# Patient Record
Sex: MALE | Race: BLACK OR AFRICAN AMERICAN | Employment: UNEMPLOYED | URBAN - METROPOLITAN AREA
[De-identification: names, ages, dates, MRNs, and addresses within clinical notes are randomized per-mention and may not be internally consistent; named-entity substitution may affect disease eponyms.]

---

## 2018-10-29 ENCOUNTER — OFFICE VISIT (OUTPATIENT)
Dept: FAMILY MEDICINE CLINIC | Age: 58
End: 2018-10-29

## 2018-10-29 VITALS
RESPIRATION RATE: 12 BRPM | HEIGHT: 65 IN | WEIGHT: 213 LBS | BODY MASS INDEX: 35.49 KG/M2 | HEART RATE: 65 BPM | OXYGEN SATURATION: 98 % | TEMPERATURE: 98.1 F | DIASTOLIC BLOOD PRESSURE: 89 MMHG | SYSTOLIC BLOOD PRESSURE: 129 MMHG

## 2018-10-29 DIAGNOSIS — M54.32 SCIATICA OF LEFT SIDE: Primary | ICD-10-CM

## 2018-10-29 PROBLEM — E66.01 SEVERE OBESITY (HCC): Status: ACTIVE | Noted: 2018-10-29

## 2018-10-29 RX ORDER — ESOMEPRAZOLE MAGNESIUM 40 MG/1
40 CAPSULE, DELAYED RELEASE ORAL DAILY
COMMUNITY
Start: 2018-05-18 | End: 2018-11-19

## 2018-10-29 RX ORDER — PREDNISONE 20 MG/1
TABLET ORAL
Qty: 9 TAB | Refills: 0 | Status: SHIPPED | OUTPATIENT
Start: 2018-10-29 | End: 2018-11-19

## 2018-10-29 RX ORDER — ALBUTEROL SULFATE 90 UG/1
2 AEROSOL, METERED RESPIRATORY (INHALATION)
COMMUNITY

## 2018-10-29 RX ORDER — DICLOFENAC SODIUM 75 MG/1
75 TABLET, DELAYED RELEASE ORAL 2 TIMES DAILY
COMMUNITY
Start: 2017-09-17 | End: 2018-11-19

## 2018-10-29 RX ORDER — AMITRIPTYLINE HYDROCHLORIDE 25 MG/1
25 TABLET, FILM COATED ORAL
COMMUNITY
Start: 2018-05-18 | End: 2018-11-19

## 2018-10-29 NOTE — PROGRESS NOTES
Unable to print After Visit Summary for this encounter. I have reviewed discharge instructions with the patient. The patient verbalized understanding. Guidance given regarding new medication(s) this visit, including reason for taking medicine, common side effects, and pharmacy medication was sent to if not printed. Alcohol dependence    Cannabis dependence    COPD (chronic obstructive pulmonary disease)    Hepatomegaly    Mood disorder    Opiate dependence

## 2018-10-29 NOTE — PROGRESS NOTES
Chief Complaint   Patient presents with    Ankle Pain     \"pt stated that he injury left ankle and causing pain on left side\"     1. When and where did you last receive medical care? Yes Where: [facility]    2. When and where did you last have preventive care such as mammogram, pap smears or colon screening? no    3. What is your current living situation (for example, live alone, live in home with immediate family members)? family    3. Do you have any problems with communication such trouble seeing, hearing, or understanding instructions? No    5. Do you have an advance directive? This is a document that you can give to family members with instructions for how you would want them to make health care decisions for you if you were unable to speak for yourself. (For example, unconscious, delerious)No    PMH/FH/Social Hx reviewed and updated as needed     Applicable screenings reviewed and updated as needed  Medication reconciliation performed. Patient does not need medication refills. Health Maintenance reviewed.

## 2018-10-29 NOTE — PROGRESS NOTES
HPI  Radha Landaverde is a 66 y.o. male being seen today for   Chief Complaint   Patient presents with    Ankle Pain     \"pt stated that he injury left ankle and causing pain on left side\"   . he states that in April 2018 he was in [facility] for work and he stepped in a hole. A tree root went in  his leg. Developed pain from ankle up his leg to hip level. Prior to this he was active and did physical work. 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 Now has pain under his scrotum that is new and low back pain was not present before. Bending over at the waist does help when pain is bad and he thinks leg might give out. Does have recent PT for hip and ankle    No incontinence. Does have some urinary hesitancy x 8 months. Past Medical History:   Diagnosis Date    Asthma          ROS  Patient states that he is feeling well. Denies complaints of chest pain, shortness of breath, swelling of legs, dizziness or weakness. he denies nausea, vomiting or diarrhea. Current Outpatient Medications   Medication Sig    albuterol (PROVENTIL HFA, VENTOLIN HFA, PROAIR HFA) 90 mcg/actuation inhaler Take 2 Puffs by inhalation every four (4) hours as needed.  predniSONE (DELTASONE) 20 mg tablet 2 tabs x 3 days, then one tab x 3 days    esomeprazole (NEXIUM) 40 mg capsule Take 40 mg by mouth daily.  amitriptyline (ELAVIL) 25 mg tablet Take 25 mg by mouth nightly.  diclofenac EC (VOLTAREN) 75 mg EC tablet Take 75 mg by mouth two (2) times a day. No current facility-administered medications for this visit. PE  Visit Vitals  /89 (BP 1 Location: Left arm, BP Patient Position: Sitting)   Pulse 65   Temp 98.1 °F (36.7 °C) (Temporal)   Resp 12   Ht 5' 5\" (1.651 m)   Wt 213 lb (96.6 kg)   SpO2 98%   BMI 35.45 kg/m²        Alert and oriented with normal mood and affect. he is well developed and well nourished . Lungs are clear without wheezing. Heart rate is regular without murmurs or gallops.  There is no lower extremity edema. Walks with a cane. Decreased strength and sensation left LE. Well healed scar left shin  No results found for this or any previous visit.       Assessment and Plan:        ICD-10-CM ICD-9-CM    1. Sciatica of left side M54.32 724.3 MRI LUMB SPINE WO CONT       Sounds like straightforward sciatica  Prednisone burst  Instructed in sciatica stretches and will check MRI marco a George MD

## 2018-10-29 NOTE — PATIENT INSTRUCTIONS
Sciatica: Exercises  Your Care Instructions  Here are some examples of typical rehabilitation exercises for your condition. Start each exercise slowly. Ease off the exercise if you start to have pain. Your doctor or physical therapist will tell you when you can start these exercises and which ones will work best for you. When you are not being active, find a comfortable position for rest. Some people are comfortable on the floor or a medium-firm bed with a small pillow under their head and another under their knees. Some people prefer to lie on their side with a pillow between their knees. Don't stay in one position for too long. Take short walks (10 to 20 minutes) every 2 to 3 hours. Avoid slopes, hills, and stairs until you feel better. Walk only distances you can manage without pain, especially leg pain. How to do the exercises  Back stretches    1. Get down on your hands and knees on the floor. 2. Relax your head and allow it to droop. Round your back up toward the ceiling until you feel a nice stretch in your upper, middle, and lower back. Hold this stretch for as long as it feels comfortable, or about 15 to 30 seconds. 3. Return to the starting position with a flat back while you are on your hands and knees. 4. Let your back sway by pressing your stomach toward the floor. Lift your buttocks toward the ceiling. 5. Hold this position for 15 to 30 seconds. 6. Repeat 2 to 4 times. Follow-up care is a key part of your treatment and safety. Be sure to make and go to all appointments, and call your doctor if you are having problems. It's also a good idea to know your test results and keep a list of the medicines you take. Where can you learn more? Go to http://jade-hermilo.info/. Enter Q144 in the search box to learn more about \"Sciatica: Exercises. \"  Current as of: November 29, 2017  Content Version: 11.8  © 1485-0937 Healthwise, Incorporated.  Care instructions adapted under license by [address] (which disclaims liability or warranty for this information). If you have questions about a medical condition or this instruction, always ask your healthcare professional. [address] any warranty or liability for your use of this information.

## 2018-11-09 ENCOUNTER — HOSPITAL ENCOUNTER (OUTPATIENT)
Dept: MRI IMAGING | Age: 58
Discharge: HOME | End: 2018-11-09
Attending: FAMILY MEDICINE

## 2018-11-09 DIAGNOSIS — M54.32 SCIATICA OF LEFT SIDE: ICD-10-CM

## 2018-11-09 PROCEDURE — 72148 MRI LUMBAR SPINE W/O DYE: CPT

## 2018-11-14 DIAGNOSIS — M54.32 SCIATICA OF LEFT SIDE: Primary | ICD-10-CM

## 2018-11-19 ENCOUNTER — OFFICE VISIT (OUTPATIENT)
Dept: FAMILY MEDICINE CLINIC | Age: 58
End: 2018-11-19

## 2018-11-19 VITALS
BODY MASS INDEX: 35.49 KG/M2 | HEART RATE: 74 BPM | WEIGHT: 213 LBS | SYSTOLIC BLOOD PRESSURE: 111 MMHG | RESPIRATION RATE: 10 BRPM | DIASTOLIC BLOOD PRESSURE: 72 MMHG | OXYGEN SATURATION: 96 % | TEMPERATURE: 97.5 F | HEIGHT: 65 IN

## 2018-11-19 DIAGNOSIS — M54.32 SCIATICA OF LEFT SIDE: Primary | ICD-10-CM

## 2018-11-19 RX ORDER — GABAPENTIN 300 MG/1
300 CAPSULE ORAL 3 TIMES DAILY
Qty: 90 CAP | Refills: 2 | Status: SHIPPED | OUTPATIENT
Start: 2018-11-19

## 2018-11-19 NOTE — PROGRESS NOTES
1. Have you been to the ER, urgent care clinic since your last visit? Hospitalized since your last visit? No    2. Have you seen or consulted any other health care providers outside of the [address] [place] [address] since your last visit? Include any pap smears or colon screening.  No

## 2018-11-19 NOTE — PROGRESS NOTES
FLORINDA Good is a 66 y.o. male being seen today for   Chief Complaint   Patient presents with    Follow-up   . he states that he is here for follow up of lumbar MRI. Continues to have left leg give out at times, also a strange pain that occurs in left groin and scrotum at times. Would like to try something for pain. No relief from elavil or nsaids in the past.    Past Medical History:   Diagnosis Date    Asthma          ROS  Patient states that he is feeling well. Denies complaints of chest pain, shortness of breath, swelling of legs, dizziness or weakness. he denies nausea, vomiting or diarrhea. Current Outpatient Medications   Medication Sig    gabapentin (NEURONTIN) 300 mg capsule Take 1 Cap by mouth three (3) times daily.  albuterol (PROVENTIL HFA, VENTOLIN HFA, PROAIR HFA) 90 mcg/actuation inhaler Take 2 Puffs by inhalation every four (4) hours as needed. No current facility-administered medications for this visit. PE  Visit Vitals  /72 (BP 1 Location: Left arm, BP Patient Position: Sitting)   Pulse 74   Temp 97.5 °F (36.4 °C) (Temporal)   Resp 10   Ht 5' 5\" (1.651 m)   Wt 213 lb (96.6 kg)   SpO2 96%   BMI 35.45 kg/m²        Alert and oriented with normal mood and affect. he is well developed and well nourished . No results found for this or any previous visit.       Assessment and Plan:        ICD-10-CM ICD-9-CM    1. Sciatica of left side M54.32 724.3      Follow up with spine clinic  Trial gabapentin      Phyllistine MD Edwin

## 2018-11-20 PROBLEM — M54.32 SCIATICA OF LEFT SIDE: Status: ACTIVE | Noted: 2018-11-20

## 2018-11-29 ENCOUNTER — TELEPHONE (OUTPATIENT)
Dept: FAMILY MEDICINE CLINIC | Age: 58
End: 2018-11-29

## 2018-11-29 NOTE — TELEPHONE ENCOUNTER
Patient Marquis Dipesh MD   You 2 weeks ago      Can you call this pt and make him follow up with spine surgery/ortho (Routing comment)         Time, date and location reviewed with patient. Patient expressed understanding. Patient applied for medicaid.

## 2019-01-07 ENCOUNTER — OFFICE VISIT (OUTPATIENT)
Dept: ORTHOPEDIC SURGERY | Age: 59
End: 2019-01-07

## 2019-01-07 VITALS
TEMPERATURE: 97.4 F | WEIGHT: 218.8 LBS | RESPIRATION RATE: 16 BRPM | BODY MASS INDEX: 36.46 KG/M2 | DIASTOLIC BLOOD PRESSURE: 81 MMHG | HEART RATE: 71 BPM | HEIGHT: 65 IN | SYSTOLIC BLOOD PRESSURE: 120 MMHG

## 2019-01-07 DIAGNOSIS — M51.37 DEGENERATION, INTERVERTEBRAL DISC, LUMBOSACRAL: Primary | ICD-10-CM

## 2019-01-07 DIAGNOSIS — M47.817 ARTHROPATHY OF LUMBOSACRAL FACET JOINT: ICD-10-CM

## 2019-01-07 DIAGNOSIS — M48.07 STENOSIS OF LATERAL RECESS OF LUMBOSACRAL SPINE: ICD-10-CM

## 2019-01-07 RX ORDER — PREDNISONE 20 MG/1
TABLET ORAL
Qty: 28 TAB | Refills: 0 | Status: SHIPPED | OUTPATIENT
Start: 2019-01-07 | End: 2019-03-08 | Stop reason: ALTCHOICE

## 2019-01-07 NOTE — LETTER
NOTIFICATION RETURN TO WORK / SCHOOL 
 
1/7/2019 3:57 PM 
 
Mr. Dread Quiroz [address] [address] To Whom It May Concern: 
 
Dread Quiroz is currently under the care of [address] [place] [address] Off work until evaluated by spine surgeon. Encounter Diagnoses Name Primary?  Degeneration, intervertebral disc, lumbosacral Yes  Stenosis of lateral recess of lumbosacral spine  Arthropathy of lumbosacral facet joint If there are questions or concerns please have the patient contact our office.  
 
 
 
Sincerely, 
 
 
Genny Ervin, DO

## 2019-01-07 NOTE — PROGRESS NOTES
HISTORY OF PRESENT ILLNESS    Brent Fung is a 66y.o. year old male comes in today as new patient for: back pain/sciatica    Patients symptoms have been present for almost 2 years. Pain level 10/10 low back into legs, It has slightly improved with predniosne and minimal w/ gabapentin  It is described as pain in low back into left leg prior and now right w/ numb/tingle as well. Original injury at work [id number] when stepped in hole and root went into left leg and twisted back. IMAGING: MRI lumbar 11/9/18  IMPRESSION:  1. Thecal sac stenosis. --High-grade at L5-S1 secondary to epidural lipomatosis. --Moderate at L4-L5 secondary to degenerative changes and epidural  lipomatosis. --Mild at L3-L4. 2.  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           --7 mm left intracanalicular facet joint synovial cyst.          --Compresses the left S1 nerve root. 3.  Lateral recess stenoses. --Marked at left L4-L5. --Lower grade at right L4-L5 and bilateral L5-S1.  4.  Facet arthrosis with joint effusions. --Marked at left L4-L5 with early changes of intracanalicular synovial  cyst accumulation. --Moderate right L4-L5 and bilateral L5-S1. No past surgical history on file. Social History     Socioeconomic History    Marital status:      Spouse name: Not on file    Number of children: Not on file    Years of education: Not on file    Highest education level: Not on file   Tobacco Use    Smoking status: Current Every Day Smoker     Packs/day: 0.50     Types: Cigarettes    Smokeless tobacco: Never Used   Substance and Sexual Activity    Alcohol use: No     Frequency: Never    Drug use: No      Current Outpatient Medications   Medication Sig Dispense Refill    gabapentin (NEURONTIN) 300 mg capsule Take 1 Cap by mouth three (3) times daily.  90 Cap 2    albuterol (PROVENTIL HFA, VENTOLIN HFA, PROAIR HFA) 90 mcg/actuation inhaler Take 2 Puffs by inhalation every four (4) hours as needed. Past Medical History:   Diagnosis Date    Asthma      Family History   Problem Relation Age of Onset    Diabetes Mother     Hypertension Mother     Asthma Mother     No Known Problems Father          ROS:  No incont, fever. All other systems reviewed and negative aside from that written in the HPI. Objective:  /81   Pulse 71   Temp 97.4 °F (36.3 °C)   Resp 16   Ht 5' 5\" (1.651 m)   Wt 218 lb 12.8 oz (99.2 kg)   BMI 36.41 kg/m²   GEN:  Appears stated age in NAD. NEURO:  Sensation intact to light touch but significant decrease perineum. Reflexes +1/4 patellar and Achilles bilaterally. M/S:  Examined seated and supine. Slump negative. LE Strength +5/5 bilaterally Piriformis minimally tight left  EXT:  no clubbing/cyanosis. no edema. SKIN: Warm & dry w/o rash. HEENT: Conjunctiva/lids WNL. External canals/nares WNL. Tongue midline. PERRL, EOMI. Hearing intact. NECK: Trachea midline. Supple, Full ROM. No thyromegaly. CARDIAC: No edema. LUNGS: Normal effort. ABD: Soft, no masses. No HSM. PSYCH: A+O x3. Appropriate judgment and insight. Assessment/Plan:     ICD-10-CM ICD-9-CM    1. Degeneration, intervertebral disc, lumbosacral M51.37 722.52 predniSONE (DELTASONE) 20 mg tablet      REFERRAL TO ORTHOPEDICS   2. Stenosis of lateral recess of lumbosacral spine M48.07 724.02 predniSONE (DELTASONE) 20 mg tablet      REFERRAL TO ORTHOPEDICS   3. Arthropathy of lumbosacral facet joint M47.817 721. 3 predniSONE (DELTASONE) 20 mg tablet      REFERRAL TO ORTHOPEDICS       Patient (or guardian if minor) verbalizes understanding of evaluation and plan. Will refer maria ines for likely intervention as significant neuro Sx w/ MRI corroboration. Prednisone taper as benefit prior.

## 2019-01-11 ENCOUNTER — OFFICE VISIT (OUTPATIENT)
Dept: ORTHOPEDIC SURGERY | Age: 59
End: 2019-01-11

## 2019-01-11 VITALS
WEIGHT: 218 LBS | DIASTOLIC BLOOD PRESSURE: 83 MMHG | OXYGEN SATURATION: 98 % | BODY MASS INDEX: 36.32 KG/M2 | HEIGHT: 65 IN | SYSTOLIC BLOOD PRESSURE: 116 MMHG | HEART RATE: 57 BPM | RESPIRATION RATE: 15 BRPM | TEMPERATURE: 98.1 F

## 2019-01-11 DIAGNOSIS — M54.50 LUMBAR SPINE PAIN: Primary | ICD-10-CM

## 2019-01-11 DIAGNOSIS — M43.16 SPONDYLOLISTHESIS OF LUMBAR REGION: ICD-10-CM

## 2019-01-11 DIAGNOSIS — R29.898 LEFT LEG WEAKNESS: ICD-10-CM

## 2019-01-11 NOTE — PATIENT INSTRUCTIONS
Electromyogram (EMG) and Nerve Conduction Studies: About These Tests  What are they? An electromyogram (EMG) measures the electrical activity of your muscles when you are not using them (at rest) and when you tighten them (muscle contraction). Nerve conduction studies (NCS) measure how well and how fast the nerves can send electrical signals. EMG and nerve conduction studies are often done together. If they are done together, the nerve conduction studies are done before the EMG. Why are they done? You may need an EMG to find diseases that damage your muscles or nerves or to find why you cannot move your muscles (paralysis), why they feel weak, or why they twitch. You may need nerve conduction studies to find damage to the nerves that lead from the brain and spinal cord to the rest of the body (peripheral nervous system). Nerve conduction studies are often used to help find nerve disorders, such as carpal tunnel syndrome. How can you prepare for these tests? · Tell your doctors ALL the medicines, vitamins, supplements, and herbal remedies you take. Some medicines can affect the test results. You may need to stop taking some medicines before you have this test.     · If you take aspirin or some other blood thinner, be sure to talk to your doctor. He or she will tell you if you should stop taking it before your test. Make sure that you understand exactly what your doctor wants you to do.     · Wear loose-fitting clothing. You may be given a hospital gown to wear.     · The electrodes for the test are attached to your skin. Your skin needs to be clean and free of sprays, oils, creams, and lotions. What happens during the tests? You lie on a table or bed or sit in a reclining chair so your muscles are relaxed. For an EMG:  · Your doctor will insert a needle electrode into a muscle.  This will record the electrical activity while the muscle is at rest. You may feel a quick, sharp pain when the needle electrode is put into a muscle. · Your doctor will ask you to tighten the same muscle slowly and steadily while the electrical activity is recorded. · Your doctor may move the electrode to a different area of the muscle or a different muscle. For nerve conduction studies:  · Your doctor will attach two types of electrodes to your skin. ? One type of electrode is placed over a nerve and will give the nerve an electrical pulse. ? The other type of electrode is placed over the muscle that the nerve controls. It will record how long it takes the muscle to react to the electrical pulse. · You will be able to feel the electrical pulses. They are small shocks and are safe. What else should you know about these tests? · After an EMG, you may be sore and have a tingling feeling in your muscles for up to 2 days. You may have small bruises or swelling at the needle site. · For an EMG, you may be asked to sign a consent form. Talk to your doctor about any concerns you have about the need for the test, its risks, how it will be done, or what the results will mean. How long do they take? · An EMG may take 30 to 60 minutes. · Nerve conduction tests may take from 15 minutes to 1 hour or more. It depends on how many nerves and muscles your doctor tests. What happens after these tests? · If any of the test areas are sore:  ? Put ice or a cold pack on the area for 10 to 20 minutes at a time. Put a thin cloth between the ice and your skin. ? Take an over-the-counter pain medicine, such as acetaminophen (Tylenol), ibuprofen (Advil, Motrin), or naproxen (Aleve). Be safe with medicines. Read and follow all instructions on the label. · You will probably be able to go home right away. · You can go back to your usual activities right away. When should you call for help?   Watch closely for changes in your health, and be sure to contact your doctor if:  · Muscle pain from an EMG test gets worse or you have swelling, tenderness, or pus at any of the needle sites. · You have any problems that you think may be from the test.  · You have any questions about the test or have not received your results. Follow-up care is a key part of your treatment and safety. Be sure to make and go to all appointments, and call your doctor if you are having problems. It's also a good idea to keep a list of the medicines you take. Ask your doctor when you can expect to have your test results. Where can you learn more? Go to http://jade-hermilo.info/. Enter F733 in the search box to learn more about \"Electromyogram (EMG) and Nerve Conduction Studies: About These Tests. \"  Current as of: June 4, 2018  Content Version: 11.8  © 6782-3190 Healthwise, Incorporated. Care instructions adapted under license by [employer] (which disclaims liability or warranty for this information). If you have questions about a medical condition or this instruction, always ask your healthcare professional. [address] any warranty or liability for your use of this information.

## 2019-01-22 ENCOUNTER — TELEPHONE (OUTPATIENT)
Dept: ORTHOPEDIC SURGERY | Age: 59
End: 2019-01-22

## 2019-01-22 ENCOUNTER — DOCUMENTATION ONLY (OUTPATIENT)
Dept: ORTHOPEDIC SURGERY | Age: 59
End: 2019-01-22

## 2019-01-22 DIAGNOSIS — M54.50 LUMBAR PAIN: Primary | ICD-10-CM

## 2019-01-22 NOTE — TELEPHONE ENCOUNTER
Pt has surgery pending with Dr. Donahue January. States he broke his cane last night and is requesting a prescription for one be sent to [facility] on [facility]. Any questions, pt can be reached at [phone].

## 2019-01-22 NOTE — TELEPHONE ENCOUNTER
Called patient and verified . Patient to call back with the medical supply store name and fax number to send the order to.

## 2019-01-22 NOTE — TELEPHONE ENCOUNTER
Called patient and verified . Patient was informed order faxed over. Patient verbalized understanding and no further questions or concerns at this time.

## 2019-01-22 NOTE — TELEPHONE ENCOUNTER
Patient called back [facility], [place], [address] phone # [phone] fax# [phone]. Patient would like a call when faxed over so he knows to go.

## 2019-01-23 ENCOUNTER — HOSPITAL ENCOUNTER (OUTPATIENT)
Dept: CT IMAGING | Age: 59
Discharge: HOME | End: 2019-01-23
Payer: MEDICAID

## 2019-01-23 DIAGNOSIS — M43.16 SPONDYLOLISTHESIS OF LUMBAR REGION: ICD-10-CM

## 2019-01-23 DIAGNOSIS — M54.50 LUMBAR SPINE PAIN: ICD-10-CM

## 2019-01-23 PROCEDURE — 72131 CT LUMBAR SPINE W/O DYE: CPT

## 2019-01-29 ENCOUNTER — OFFICE VISIT (OUTPATIENT)
Dept: CARDIOLOGY CLINIC | Age: 59
End: 2019-01-29

## 2019-01-29 VITALS
BODY MASS INDEX: 36.99 KG/M2 | HEART RATE: 82 BPM | DIASTOLIC BLOOD PRESSURE: 75 MMHG | HEIGHT: 65 IN | WEIGHT: 222 LBS | SYSTOLIC BLOOD PRESSURE: 111 MMHG

## 2019-01-29 DIAGNOSIS — F17.200 SMOKER: ICD-10-CM

## 2019-01-29 DIAGNOSIS — I45.6 WPW (WOLFF-PARKINSON-WHITE SYNDROME): ICD-10-CM

## 2019-01-29 DIAGNOSIS — R94.31 ABNORMAL ELECTROCARDIOGRAM: Primary | ICD-10-CM

## 2019-01-29 DIAGNOSIS — I50.32 CHRONIC DIASTOLIC CONGESTIVE HEART FAILURE (HCC): ICD-10-CM

## 2019-01-29 RX ORDER — FUROSEMIDE 20 MG/1
20 TABLET ORAL DAILY
Qty: 30 TAB | Refills: 4 | Status: SHIPPED | OUTPATIENT
Start: 2019-01-29 | End: 2019-05-16 | Stop reason: SDUPTHER

## 2019-01-29 NOTE — PROGRESS NOTES
HISTORY OF PRESENT ILLNESS  Katelyn Shook is a 66 y.o. male. New Patient   The history is provided by the patient. This is a recurrent problem. The problem occurs every several days. The problem has not changed since onset. Associated symptoms include shortness of breath. Pertinent negatives include no chest pain, no abdominal pain and no headaches. Shortness of Breath   The history is provided by the patient. This is a chronic problem. The problem occurs frequently. The problem has been gradually worsening. Associated symptoms include leg swelling. Pertinent negatives include no fever, no headaches, no ear pain, no neck pain, no cough, no sputum production, no hemoptysis, no wheezing, no PND, no orthopnea, no chest pain, no vomiting, no abdominal pain, no rash and no claudication. Associated medical issues include heart failure. Palpitations    The history is provided by the patient. This is a chronic problem. The problem has not changed since onset. The problem occurs every several days. Associated symptoms include shortness of breath. Pertinent negatives include no diaphoresis, no fever, no chest pain, no claudication, no orthopnea, no PND, no abdominal pain, no nausea, no vomiting, no headaches, no dizziness, no weakness, no cough, no hemoptysis and no sputum production. Review of Systems   Constitutional: Negative for chills, diaphoresis, fever and weight loss. HENT: Negative for ear pain and hearing loss. Eyes: Negative for blurred vision. Respiratory: Positive for shortness of breath. Negative for cough, hemoptysis, sputum production, wheezing and stridor. Cardiovascular: Positive for palpitations and leg swelling. Negative for chest pain, orthopnea, claudication and PND. Gastrointestinal: Negative for abdominal pain, heartburn, nausea and vomiting. Musculoskeletal: Negative for myalgias and neck pain. Skin: Negative for rash.    Neurological: Negative for dizziness, tingling, tremors, focal weakness, loss of consciousness, weakness and headaches. Psychiatric/Behavioral: Negative for depression and suicidal ideas. Family History   Problem Relation Age of Onset    Diabetes Mother     Hypertension Mother     Asthma Mother     No Known Problems Father        Past Medical History:   Diagnosis Date    Asthma        History reviewed. No pertinent surgical history. Social History     Tobacco Use    Smoking status: Current Every Day Smoker     Packs/day: 0.50     Types: Cigarettes    Smokeless tobacco: Never Used   Substance Use Topics    Alcohol use: Yes     Frequency: 2-4 times a month     Drinks per session: 3 or 4     Binge frequency: Never       No Known Allergies    Outpatient Medications Marked as Taking for the 1/29/19 encounter (Office Visit) with Fernando Fritz MD   Medication Sig Dispense Refill    furosemide (LASIX) 20 mg tablet Take 1 Tab by mouth daily. 30 Tab 4    predniSONE (DELTASONE) 20 mg tablet Take 2 tabs in AM with food for 7 days then 1 tab in AM with food until gone 28 Tab 0    gabapentin (NEURONTIN) 300 mg capsule Take 1 Cap by mouth three (3) times daily. 90 Cap 2    albuterol (PROVENTIL HFA, VENTOLIN HFA, PROAIR HFA) 90 mcg/actuation inhaler Take 2 Puffs by inhalation every four (4) hours as needed. Visit Vitals  /75   Pulse 82   Ht 5' 5\" (1.651 m)   Wt 100.7 kg (222 lb)   BMI 36.94 kg/m²     Physical Exam   Constitutional: He is oriented to person, place, and time. He appears well-developed and well-nourished. No distress. HENT:   Head: Atraumatic. Mouth/Throat: No oropharyngeal exudate. Eyes: Conjunctivae are normal. No scleral icterus. Neck: Normal range of motion. Neck supple. No JVD present. No tracheal deviation present. No thyromegaly present. Cardiovascular: Normal rate and regular rhythm. Exam reveals no gallop. No murmur heard. Pulmonary/Chest: Effort normal and breath sounds normal. No stridor.  He has no wheezes. He has no rales. Abdominal: Soft. There is no tenderness. There is no rebound and no guarding. Musculoskeletal: Normal range of motion. He exhibits edema (mild ble edema). He exhibits no tenderness. Neurological: He is alert and oriented to person, place, and time. He exhibits normal muscle tone. Skin: Skin is warm. He is not diaphoretic. Psychiatric: He has a normal mood and affect. His behavior is normal.     ekg sinus rhythm with WPW  ASSESSMENT and PLAN    ICD-10-CM ICD-9-CM    1. Abnormal electrocardiogram R94.31 794.31 AMB POC EKG ROUTINE W/ 12 LEADS, INTER & REP   2. Chronic diastolic congestive heart failure (HCC) I50.32 428.32 MAGNESIUM     974.3 METABOLIC PANEL, BASIC      ECHO ADULT COMPLETE    NYHA class III   3. WPW (Rashad-Parkinson-White syndrome) I45.6 426.7 [place] EXTERNAL MOBILE CV TELEMETRY W/I&REPORT UP TO 30 DAYS   4. Smoker F17.200 305.1      Orders Placed This Encounter    MAGNESIUM     Standing Status:   Future     Standing Expiration Date:   0/70/7878    METABOLIC PANEL, BASIC     Standing Status:   Future     Standing Expiration Date:   1/30/2020    AMB POC EKG ROUTINE W/ 12 LEADS, INTER & REP     Order Specific Question:   Reason for Exam:     Answer:   abn ekg    [place] EXTERNAL MOBILE CV TELEMETRY W/I&REPORT UP TO 30 DAYS    furosemide (LASIX) 20 mg tablet     Sig: Take 1 Tab by mouth daily. Dispense:  30 Tab     Refill:  4     Follow-up Disposition:  Return in about 6 weeks (around 3/12/2019). reviewed diet, exercise and weight control  very strongly urged to quit smoking to reduce cardiovascular risk. Patient is a 68-year-old male seen for worsening exertional dyspnea and abnormal EKG. Exertional dyspnea for the last several months with intermittent orthopnea and PND. Also complains of chronic lower extremity edema. Has mild chronic diastolic CHF NYHA class II/III. We will obtain echocardiogram and start him on Lasix. EKG reveals sinus rhythm with WPW. Complains of intermittent palpitations and dizziness which is chronic for him. Will obtain cardiac monitor to assess atrial/ventricular arrhythmias. Follow-up in 6 weeks.

## 2019-01-31 ENCOUNTER — HOSPITAL ENCOUNTER (OUTPATIENT)
Dept: LAB | Age: 59
Discharge: HOME | End: 2019-01-31

## 2019-01-31 PROCEDURE — 99001 SPECIMEN HANDLING PT-LAB: CPT

## 2019-02-01 ENCOUNTER — HOSPITAL ENCOUNTER (OUTPATIENT)
Dept: RESPIRATORY THERAPY | Age: 59
Discharge: HOME | End: 2019-02-01
Attending: FAMILY MEDICINE
Payer: MEDICAID

## 2019-02-01 DIAGNOSIS — R06.02 SOB (SHORTNESS OF BREATH): ICD-10-CM

## 2019-02-01 DIAGNOSIS — J45.909 ASTHMATIC BRONCHITIS: ICD-10-CM

## 2019-02-01 LAB
BUN SERPL-MCNC: 16 MG/DL (ref 6–24)
BUN/CREAT SERPL: 15 (ref 9–20)
CALCIUM SERPL-MCNC: 9.7 MG/DL (ref 8.7–10.2)
CHLORIDE SERPL-SCNC: 101 MMOL/L (ref 96–106)
CO2 SERPL-SCNC: 23 MMOL/L (ref 20–29)
CREAT SERPL-MCNC: 1.05 MG/DL (ref 0.76–1.27)
GLUCOSE SERPL-MCNC: 88 MG/DL (ref 65–99)
MAGNESIUM SERPL-MCNC: 2.2 MG/DL (ref 1.6–2.3)
POTASSIUM SERPL-SCNC: 4 MMOL/L (ref 3.5–5.2)
SODIUM SERPL-SCNC: 141 MMOL/L (ref 134–144)

## 2019-02-01 PROCEDURE — 94729 DIFFUSING CAPACITY: CPT

## 2019-02-01 PROCEDURE — 94727 GAS DIL/WSHOT DETER LNG VOL: CPT

## 2019-02-01 PROCEDURE — 94060 EVALUATION OF WHEEZING: CPT

## 2019-02-05 DIAGNOSIS — I50.32 CHRONIC DIASTOLIC CONGESTIVE HEART FAILURE (HCC): ICD-10-CM

## 2019-02-08 NOTE — TELEPHONE ENCOUNTER
Called and spoke with patient advised of medication and appointment. Patient states understanding.     Patient scheduled with Dr Marco Posada on 2/14/19 @ 8:40 am

## 2019-02-11 ENCOUNTER — CLINICAL SUPPORT (OUTPATIENT)
Dept: CARDIOLOGY CLINIC | Age: 59
End: 2019-02-11

## 2019-02-11 VITALS
WEIGHT: 222 LBS | SYSTOLIC BLOOD PRESSURE: 125 MMHG | HEIGHT: 65 IN | DIASTOLIC BLOOD PRESSURE: 80 MMHG | BODY MASS INDEX: 36.99 KG/M2

## 2019-02-11 DIAGNOSIS — I50.32 CHRONIC DIASTOLIC CONGESTIVE HEART FAILURE (HCC): ICD-10-CM

## 2019-02-13 LAB
ECHO AO ASC DIAM: 3.53 CM
ECHO AO ROOT DIAM: 3.73 CM
ECHO AV PEAK GRADIENT: 8 MMHG
ECHO AV PEAK VELOCITY: 141.78 CM/S
ECHO EST RA PRESSURE: 3 MMHG
ECHO LA AREA 2C: 21.42 CM2
ECHO LA AREA 4C: 14.5 CM2
ECHO LA MAJOR AXIS: 4.12 CM
ECHO LA VOL 2C: 67.76 ML (ref 18–58)
ECHO LA VOL 4C: 32.65 ML (ref 18–58)
ECHO LA VOL BP: 54.4 ML (ref 18–58)
ECHO LA VOL/BSA BIPLANE: 26.31 ML/M2 (ref 16–28)
ECHO LA VOLUME INDEX A2C: 32.77 ML/M2 (ref 16–28)
ECHO LA VOLUME INDEX A4C: 15.79 ML/M2 (ref 16–28)
ECHO LV E' LATERAL VELOCITY: 10.74 CM/S
ECHO LV E' SEPTAL VELOCITY: 10.22 CM/S
ECHO LV INTERNAL DIMENSION DIASTOLIC: 4.83 CM (ref 4.2–5.9)
ECHO LV INTERNAL DIMENSION SYSTOLIC: 3.29 CM
ECHO LV IVSD: 1.17 CM (ref 0.6–1)
ECHO LV MASS 2D: 283.7 G (ref 88–224)
ECHO LV MASS INDEX 2D: 137.2 G/M2 (ref 49–115)
ECHO LV POSTERIOR WALL DIASTOLIC: 1.36 CM (ref 0.6–1)
ECHO LVOT PEAK GRADIENT: 5.3 MMHG
ECHO LVOT PEAK VELOCITY: 115.27 CM/S
ECHO MV "A" WAVE DURATION: 133.1 MSEC
ECHO MV A VELOCITY: 56.9 CM/S
ECHO MV AREA PHT: 3.1 CM2
ECHO MV E DECELERATION TIME (DT): 242.1 MS
ECHO MV E VELOCITY: 0.78 CM/S
ECHO MV E/A RATIO: 0.01
ECHO MV E/E' LATERAL: 0.07
ECHO MV E/E' RATIO (AVERAGED): 0.07
ECHO MV E/E' SEPTAL: 0.08
ECHO MV PRESSURE HALF TIME (PHT): 70.2 MS
ECHO PULMONARY ARTERY SYSTOLIC PRESSURE (PASP): 27 MMHG
ECHO PV MAX VELOCITY: 99.92 CM/S
ECHO PV PEAK GRADIENT: 4 MMHG
ECHO RA AREA 4C: 18.8 CM2
ECHO RV INTERNAL DIMENSION: 4.2 CM
ECHO RV TAPSE: 2.15 CM (ref 1.5–2)
ECHO TV REGURGITANT MAX VELOCITY: 244.85 CM/S
ECHO TV REGURGITANT PEAK GRADIENT: 24 MMHG

## 2019-02-25 ENCOUNTER — DOCUMENTATION ONLY (OUTPATIENT)
Dept: CARDIOLOGY CLINIC | Age: 59
End: 2019-02-25

## 2019-02-25 NOTE — PROGRESS NOTES
Asked by Dr. Sophia Lovell to see for WPW, was placed on schedule 2/14/19 in office. No-showed. Office called patient and sent letter, no response. I will forward to Dr. Sophia Lovell, will continue to try to arrange appointment.

## 2019-02-27 ENCOUNTER — OFFICE VISIT (OUTPATIENT)
Dept: ORTHOPEDIC SURGERY | Age: 59
End: 2019-02-27

## 2019-02-27 VITALS
HEIGHT: 66 IN | DIASTOLIC BLOOD PRESSURE: 64 MMHG | HEART RATE: 62 BPM | RESPIRATION RATE: 14 BRPM | WEIGHT: 219.2 LBS | BODY MASS INDEX: 35.23 KG/M2 | OXYGEN SATURATION: 99 % | SYSTOLIC BLOOD PRESSURE: 97 MMHG | TEMPERATURE: 97.5 F

## 2019-02-27 DIAGNOSIS — R20.0 NUMBNESS AND TINGLING OF LEFT LEG: ICD-10-CM

## 2019-02-27 DIAGNOSIS — R94.131 ABNORMAL EMG: ICD-10-CM

## 2019-02-27 DIAGNOSIS — M79.605 LEFT LEG PAIN: Primary | ICD-10-CM

## 2019-02-27 DIAGNOSIS — R29.898 WEAKNESS OF LEFT LEG: ICD-10-CM

## 2019-02-27 DIAGNOSIS — R20.2 NUMBNESS AND TINGLING OF LEFT LEG: ICD-10-CM

## 2019-02-27 NOTE — PROGRESS NOTES
[address]  [place], [address] [place] [address] Street  Phone: [phone]  Fax: [phone]        Lili Chucho  : 1960  PCP: George Villalta MD  2019    ELECTROMYOGRAPHY AND NERVE CONDUCTION STUDIES    Yon Lorenzana was referred by Dr. Elizabeth Mullins for electrodiagnostic evaluation of LLE paraesthesia. NCV & EMG Findings:  All nerve conduction studies (as indicated in the following tables) were within normal limits. Needle evaluation of the left tensor fascia juan and the left anterior tibialis muscles showed slightly increased polyphasic potentials. The left gastroc muscle showed increased insertional activity, slightly increased spontaneous activity, and slightly increased polyphasic potentials. All remaining muscles (as indicated in the following table) showed no evidence of electrical instability. INTERPRETATION  This is an abnormal electrodiagnostic examination. These findings may be consistent with chronic left L5 and S1 lumbar radiculopathy without active denervation. CLINICAL INTERPRETATION  His electrodiagnostic findings may explain his symptoms. Some of his weakness may be pain related. It would be expected that the L5 myotome would show the most abnormality given his weakness. Instead, more significant findings were found in the S1 myotome. HISTORY OF PRESENT ILLNESS  Dread Quiroz is a 66 y.o. male. Pt presents today for LLE EMG for left leg pain and episodic paraesthesia. He notes that he has numbness in his toes on his left foot, but his sensation is otherwise intact. He fell while at work 2 years ago (2017), and he injured his low back and left leg. He states a piece of root went into his left shin. 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 He notes he did not seek medical treatment until recently.  He notes that he has episodic numbness in his LLE that radiates up into his groin as well. Now he has difficulty walking due to pain and fatigue. His XR images demonstrate a Grade 1 spondylolisthesis at L4-5. PAST MEDICAL HISTORY   Past Medical History:   Diagnosis Date    Asthma        No past surgical history on file. [place] MEDICATIONS      Current Outpatient Medications   Medication Sig Dispense Refill    apixaban (ELIQUIS) 5 mg tablet Take 1 Tab by mouth two (2) times a day. 60 Tab 6    furosemide (LASIX) 20 mg tablet Take 1 Tab by mouth daily. 30 Tab 4    predniSONE (DELTASONE) 20 mg tablet Take 2 tabs in AM with food for 7 days then 1 tab in AM with food until gone 28 Tab 0    gabapentin (NEURONTIN) 300 mg capsule Take 1 Cap by mouth three (3) times daily. 90 Cap 2    albuterol (PROVENTIL HFA, VENTOLIN HFA, PROAIR HFA) 90 mcg/actuation inhaler Take 2 Puffs by inhalation every four (4) hours as needed. ALLERGIES  No Known Allergies       SOCIAL HISTORY    Social History     Socioeconomic History    Marital status:      Spouse name: Not on file    Number of children: Not on file    Years of education: Not on file    Highest education level: Not on file   Tobacco Use    Smoking status: Current Every Day Smoker     Packs/day: 0.50     Types: Cigarettes    Smokeless tobacco: Never Used   Substance and Sexual Activity    Alcohol use: Yes     Frequency: 2-4 times a month     Drinks per session: 3 or 4     Binge frequency: Never    Drug use: No       FAMILY HISTORY  Family History   Problem Relation Age of Onset    Diabetes Mother     Hypertension Mother     Asthma Mother     No Known Problems Father          PHYSICAL EXAMINATION  Visit Vitals  BP 97/64   Pulse 62   Temp 97.5 °F (36.4 °C) (Oral)   Resp 14   Ht 5' 6\" (1.676 m)   Wt 219 lb 3.2 oz (99.4 kg)   SpO2 99%   BMI 35.38 kg/m²       Pain Assessment  1/11/2019   Location of Pain Back;Leg   Location Modifiers Inferior; Left   Severity of Pain 10   Quality of Pain Dull;Aching; Other (Comment) Quality of Pain Comment Cramping. N/T bilateral feet. Duration of Pain A few hours   Frequency of Pain Intermittent   Aggravating Factors Bending;Standing;Walking;Stretching;Straightening;Stairs; Exercise;Kneeling;Squatting   Limiting Behavior Yes   Relieving Factors Nothing   Result of Injury No           Constitutional:  Well developed, well nourished, in no acute distress. Psychiatric: Affect and mood are appropriate. Integumentary: No rashes or abrasions noted on exposed areas. SPINE/MUSCULOSKELETAL EXAM    DTRs are 2+ biceps, triceps, brachioradialis, patella, and Achilles. On brief examination: Decreased sensation in toes of left foot. Weakness of left EHL and tibialis anterior (3+/5). Scar tissue on left shin.     MOTOR:      Biceps  Triceps Deltoids Wrist Ext Wrist Flex Hand Intrin   Right 5/5 5/5 5/5 5/5 5/5 5/5   Left 5/5 5/5 5/5 5/5 5/5 5/5             Hip Flex  Quads Hamstrings Ankle DF EHL Ankle PF   Right 5/5 5/5 5/5 5/5 5/5 5/5   Left 5/5 5/5 5/5 3+/5 3+/5 5/5     NCV & EMG Findings:  Nerve Conduction Studies  Anti Sensory Summary Table     Stim Site NR Peak (ms) Norm Peak (ms) O-P Amp (µV) Norm O-P Amp Site1 Site2 Delta-P (ms) Dist (cm) Evan (m/s) Norm Evan (m/s)   Left Sup Fibular Anti Sensory (Ant Lat Mall)   14 cm    3.1 <4.4 9.2 >5.0 14 cm Ant Lat Mall 3.1 14.0 45 >32   Left Sural Anti Sensory (Lat Mall)   Calf    3.2 <4.0 15.8 >5.0 Calf Lat Mall 3.2 14.0 44 >35     Motor Summary Table     Stim Site NR Onset (ms) Norm Onset (ms) O-P Amp (mV) Norm O-P Amp Site1 Site2 Delta-0 (ms) Dist (cm) Evan (m/s) Norm Evan (m/s)   Left Fibular Motor (Ext Dig Brev)   Ankle    3.6 <6.1 8.0 >2.5 B Fib Ankle 6.9 31.0 45 >38   B Fib    10.5  7.1  Poplt B Fib 1.5 8.0 53 >40   Poplt    12.0  5.7          Left Tibial Motor (Abd Silva Brev)   Ankle    4.5 <6.1 7.9 >3.0 Knee Ankle 8.7 40.0 46 >35   Knee    13.2  4.4            EMG     Side Muscle Nerve Root Ins Act Fibs Psw Amp Dur Poly Recrt Int Dennice Pronto Comment Left GluteusMed SupGluteal L5-S1 Nml Nml Nml Nml Nml 0 Nml Nml    Left TensorFascLat SupGluteal L4-5, S1 Nml Nml Nml Nml Nml 1+ Nml Nml    Left VastusMed Femoral L2-4 Nml Nml Nml Nml Nml 0 Nml Nml    Left AntTibialis Dp Br Fibular L4-5 Nml Nml Nml Nml Nml 1+ Nml Nml    Left Gastroc Tibial S1-2 Incr 1+ 1+ Nml Nml 1+ Nml Nml CRD   Left Lumbo Parasp Up Rami L1-2 Nml Nml Nml         Left Lumbo Parasp Mid Rami L3-4 Nml Nml Nml         Left Lumbo Parasp Low Rami L5-S1 Nml Nml Nml      CRD       Nerve Conduction Studies  Anti Sensory Left/Right Comparison     Stim Site L Lat (ms) R Lat (ms) L-R Lat (ms) L Amp (µV) R Amp (µV) L-R Amp (%) Site1 Site2 L Evan (m/s) R Evan (m/s) L-R Evan (m/s)   Sup Fibular Anti Sensory (Ant Lat Mall)   14 cm 3.1   9.2   14 cm Ant Lat Mall 45     Sural Anti Sensory (Lat Mall)   Calf 3.2   15.8   Calf Lat Mall 44       Motor Left/Right Comparison     Stim Site L Lat (ms) R Lat (ms) L-R Lat (ms) L Amp (mV) R Amp (mV) L-R Amp (%) Site1 Site2 L Evan (m/s) R Evan (m/s) L-R Evan (m/s)   Fibular Motor (Ext Dig Brev)   Ankle 3.6   8.0   B Fib Ankle 45     B Fib 10.5   7.1   Poplt B Fib 53     Poplt 12.0   5.7          Tibial Motor (Abd Silva Brev)   Ankle 4.5   7.9   Knee Ankle 46     Knee 13.2   4.4                Waveforms:                    [facility]  OFFICE PROCEDURE PROGRESS NOTE        Chart reviewed for the following:   Abraham ACEVES, have reviewed the History, Physical and updated the Allergic reactions for Yon Angulo     TIME OUT performed immediately prior to start of procedure:   Abraham ACEVES, have performed the following reviews on Yon Angulo prior to the start of the procedure:            * Patient was identified by name and date of birth   * Agreement on procedure being performed was verified  * Risks and Benefits explained to the patient  * Procedure site verified and marked as necessary  * Patient was positioned for comfort  * Consent was signed and verified     Time: 1:33pm      Date of procedure: 2/27/2019    Procedure performed by:  Mariluz Carrera MD    Provider accompanied by: Gary. Patient accompanied by: None.     How tolerated by patient: tolerated the procedure well with no complications    Post Procedural Pain Scale: 0 - No Hurt    Comments: none    Written by Reyes Sims, [address] as dictated by Manjeet Dubon MD

## 2019-02-28 ENCOUNTER — TELEPHONE (OUTPATIENT)
Dept: ORTHOPEDIC SURGERY | Age: 59
End: 2019-02-28

## 2019-02-28 NOTE — TELEPHONE ENCOUNTER
03:55 pm Patient called back as he would like to know the results of the EMG for himself.   Please call patient back at [phone]

## 2019-02-28 NOTE — TELEPHONE ENCOUNTER
Called patient, no answer, voicemail was left informing patient that I am unable to give him the results of his EMG, it is out of my scope of practice. However, he was also informed that MD Kay Nova will be back into the office on tomorrow.

## 2019-02-28 NOTE — TELEPHONE ENCOUNTER
Spoke with patient, name and  were both verified. Patient is awrae that per MD Eddie Browne the  will need to fax us the information needed, from there it will go to scanning and shortly after we will be able to release the information/results needed. Patient verbalized understanding. No further action needed at this present time.

## 2019-02-28 NOTE — TELEPHONE ENCOUNTER
Looks like he had one with Dr. Bobbi Silva yesterday, note has been signed, he can come down to the office to sign a release and  a copy

## 2019-02-28 NOTE — TELEPHONE ENCOUNTER
Patient called in states that he had an EMG  On 02/27/19 and he was told her could get his results 24 hours later so he can let his  know what they were. Please contact patient at [phone].

## 2019-03-01 NOTE — TELEPHONE ENCOUNTER
03/01/2019 Patient calling back checking on call with Dr. Marya Robledo. Darrold Goldberg did inform Tyrese Robledo is still doing EMG and he would call the patient back after clinic. Tyrese Zhang made patient aware.

## 2019-03-05 ENCOUNTER — OFFICE VISIT (OUTPATIENT)
Dept: ORTHOPEDIC SURGERY | Age: 59
End: 2019-03-05

## 2019-03-05 VITALS
BODY MASS INDEX: 36.38 KG/M2 | WEIGHT: 225.4 LBS | RESPIRATION RATE: 17 BRPM | DIASTOLIC BLOOD PRESSURE: 84 MMHG | SYSTOLIC BLOOD PRESSURE: 137 MMHG

## 2019-03-05 DIAGNOSIS — M43.16 SPONDYLOLISTHESIS OF LUMBAR REGION: Primary | ICD-10-CM

## 2019-03-05 DIAGNOSIS — M43.16 SPONDYLOLISTHESIS OF LUMBAR REGION: ICD-10-CM

## 2019-03-05 NOTE — PROGRESS NOTES
[facility]  [address], [address]  [place], [address]  Phone: [phone]  Fax: [phone]  PROGRESS NOTE  Patient: Tirso Miranda                MRN: [id number]       SSN: [id number]  YOB: 1960        AGE: 66 y.o. SEX: male  There is no height or weight on file to calculate BMI. PCP: Debbie Pride MD  03/05/19    Chief Complaint   Patient presents with    Back Pain     CT/EMG fu       HISTORY OF PRESENT ILLNESS, RADIOGRAPHS, and PLAN:     HISTORY OF PRESENT ILLNESS:  Mr. Maribel Rivas returns today. He is continuing to have severe back pain with also some leg pain. EMG demonstrated a chronic radiculopathy on the left. CT scan demonstrated transitional anatomy. Concerns about placement of sacral screws. I believe the CT radiologist and the MR radiologist used a different numbering scheme. I think his greatest pathology is the L4-5 degenerative spondylolisthesis, but he does have significant facet degeneration at L5-S1 as well that I cannot separate from his pathologic entity. We reviewed required decompression and fusion of both segments. I think he can be approached from an MIF standpoint from an ALIF at L5-S1 and XLIF with Perc screws at L4-5. That would be preferable given his size, his work status and age doing a standard posterior fusion. ASSESSMENT/PLAN:  I have discussed the risks, benefits, complications and alternatives for surgery. He wishes to proceed. Will proceed with a decompressive fusion procedure at L4-5 and L5-S1. I explained to him the possibility he may require a secondary decompressive effort, but that is very unlikely in my experience. Will proceed once appropriate approvals and clearances take place. He informed me he had recently started smoking again, but that he will quit. I explained to him the imperative nature of him quitting smoking before any type of surgical intervention.        cc:   Lloyd           Past Medical History:   Diagnosis Date    Asthma        Family History   Problem Relation Age of Onset    Diabetes Mother     Hypertension Mother     Asthma Mother     No Known Problems Father        Current Outpatient Medications   Medication Sig Dispense Refill    apixaban (ELIQUIS) 5 mg tablet Take 1 Tab by mouth two (2) times a day. 60 Tab 6    furosemide (LASIX) 20 mg tablet Take 1 Tab by mouth daily. 30 Tab 4    predniSONE (DELTASONE) 20 mg tablet Take 2 tabs in AM with food for 7 days then 1 tab in AM with food until gone 28 Tab 0    gabapentin (NEURONTIN) 300 mg capsule Take 1 Cap by mouth three (3) times daily. 90 Cap 2    albuterol (PROVENTIL HFA, VENTOLIN HFA, PROAIR HFA) 90 mcg/actuation inhaler Take 2 Puffs by inhalation every four (4) hours as needed. No Known Allergies    No past surgical history on file. Past Medical History:   Diagnosis Date    Asthma        Social History     Socioeconomic History    Marital status:      Spouse name: Not on file    Number of children: Not on file    Years of education: Not on file    Highest education level: Not on file   Social Needs    Financial resource strain: Not on file    Food insecurity - worry: Not on file    Food insecurity - inability: Not on file    Transportation needs - medical: Not on file   [employer] needs - non-medical: Not on file   Occupational History    Not on file   Tobacco Use    Smoking status: Current Every Day Smoker     Packs/day: 0.50     Types: Cigarettes    Smokeless tobacco: Never Used   Substance and Sexual Activity    Alcohol use: Yes     Frequency: 2-4 times a month     Drinks per session: 3 or 4     Binge frequency: Never    Drug use: No    Sexual activity: Not on file   Other Topics Concern    Not on file   Social History Narrative    Not on file         REVIEW OF SYSTEMS:   CONSTITUTIONAL SYMPTOMS:  Negative. EYES:  Negative.    EARS, NOSE, THROAT AND MOUTH:  Negative. CARDIOVASCULAR:  Negative. RESPIRATORY:  Negative. GENITOURINARY: Per HPI. GASTROINTESTINAL:  Per HPI. INTEGUMENTARY (SKIN AND/OR BREAST):  Negative. MUSCULOSKELETAL: Per HPI.   ENDOCRINE/RHEUMATOLOGIC:  Negative. NEUROLOGICAL:  Per HPI. HEMATOLOGIC/LYMPHATIC:  Negative. ALLERGIC/IMMUNOLOGIC:  Negative. PSYCHIATRIC:  Negative. PHYSICAL EXAMINATION:   There were no vitals taken for this visit. PAIN SCALE: /10    CONSTITUTIONAL: The patient is in no apparent distress and is alert and oriented x 3. HEENT: Normocephalic. Hearing grossly intact. NECK: Supple and symmetric. no tenderness, or masses were felt. RESPIRATORY: No labored breathing. CARDIOVASCULAR: The carotid pulses were normal. Peripheral pulses were 2+. CHEST: Normal AP diameter and normal contour without any kyphoscoliosis. LYMPHATIC: No lymphadenopathy was appreciated in the neck, axillae or groin. SKIN:  Negative for scars, rashes, lesions, or ulcers on the right upper, right lower, left upper, left lower and trunk. NEUROLOGICAL: Alert and oriented x 3. Ambulation with quad cane. FWB. EXTREMITIES: See musculoskeletal.  MUSCULOSKELETAL:   Head and Neck:  Negative for misalignment, asymmetry, crepitation, defects, tenderness masses or effusions.  Left Upper Extremity: Inspection, percussion and palpation performed. Kincaids sign is negative.  Right Upper Extremity: Inspection, percussion and palpation performed. Kincaids sign is negative.  Spine, Ribs and Pelvis: Back pain. Short walking tolerance. Inspection, percussion and palpation performed. Negative for misalignment, asymmetry, crepitation, defects, tenderness masses or effusions.  Left Lower Extremity: Inspection, percussion and palpation performed. Negative straight leg raise.  Right Lower Extremity: Inspection, percussion and palpation performed. Negative straight leg raise.         SPINE EXAM:     Lumbar spine: No rash, ecchymosis, or gross obliquity. No focal atrophy is noted. ASSESSMENT    ICD-10-CM ICD-9-CM    1. Spondylolisthesis of lumbar region M43.16 738.4        Written by Monae Thomas, as dictated by Silva Rogers MD.    I, Dr. Silva Rogers MD, confirm that all documentation is accurate.

## 2019-03-05 NOTE — PROGRESS NOTES
[facility]   Pre-Surgical Worksheet    Patient: Shauna Mccann                         MRN: [id number]     Age:  66 y.o.,      Sex: male    YOB: 1960           PHUONG: March 5, 2019  PCP: Mario Hussein MD    No Known Allergies      ICD-10-CM ICD-9-CM    1. Spondylolisthesis of lumbar region M43.16 738.4        Surgery: ALIF L5/S1; XLIF L4/5 past fixation. Pain Assessment   Pain Assessment  3/5/2019   Location of Pain Back   Location Modifiers Left   Severity of Pain 6   Quality of Pain Aching; Sharp   Quality of Pain Comment -   Duration of Pain -   Frequency of Pain Constant   Aggravating Factors Standing;Walking   Limiting Behavior -   Relieving Factors Rest;Elevation   Result of Injury -       Visit Vitals  /84 (BP 1 Location: Left arm, BP Patient Position: Sitting)   Resp 17   Wt 225 lb 6.4 oz (102.2 kg)   BMI 36.38 kg/m²       ADL Limits: Bathing, Dressing and Cane    Spine Surgery?: No.    Spinal Injections?: No.    Physical Therapy?: No:   When: 2018-for left leg. Where: [facility]. NSAID's?: Yes    Pain Medications?: No.    In Pain Management: No.    Current Outpatient Medications   Medication Sig    apixaban (ELIQUIS) 5 mg tablet Take 1 Tab by mouth two (2) times a day.  furosemide (LASIX) 20 mg tablet Take 1 Tab by mouth daily.  gabapentin (NEURONTIN) 300 mg capsule Take 1 Cap by mouth three (3) times daily.  albuterol (PROVENTIL HFA, VENTOLIN HFA, PROAIR HFA) 90 mcg/actuation inhaler Take 2 Puffs by inhalation every four (4) hours as needed.  predniSONE (DELTASONE) 20 mg tablet Take 2 tabs in AM with food for 7 days then 1 tab in AM with food until gone     No current facility-administered medications for this visit. Past Medical History:   Diagnosis Date    Asthma        History reviewed. No pertinent surgical history.     Social History     Socioeconomic History    Marital status:      Spouse name: Not on file  Number of children: Not on file    Years of education: Not on file    Highest education level: Not on file   Tobacco Use    Smoking status: Current Every Day Smoker     Packs/day: 0.50     Types: Cigarettes    Smokeless tobacco: Never Used   Substance and Sexual Activity    Alcohol use: Yes     Frequency: 2-4 times a month     Drinks per session: 3 or 4     Binge frequency: Never    Drug use:  No

## 2019-03-05 NOTE — PATIENT INSTRUCTIONS

## 2019-03-08 ENCOUNTER — HOSPITAL ENCOUNTER (OUTPATIENT)
Dept: LAB | Age: 59
Discharge: HOME | End: 2019-03-08
Payer: MEDICAID

## 2019-03-08 ENCOUNTER — HOSPITAL ENCOUNTER (OUTPATIENT)
Dept: LAB | Age: 59
End: 2019-03-08
Payer: MEDICAID

## 2019-03-08 ENCOUNTER — HOSPITAL ENCOUNTER (OUTPATIENT)
Dept: GENERAL RADIOLOGY | Age: 59
Discharge: HOME | End: 2019-03-08
Payer: MEDICAID

## 2019-03-08 ENCOUNTER — OFFICE VISIT (OUTPATIENT)
Dept: CARDIOLOGY CLINIC | Age: 59
End: 2019-03-08

## 2019-03-08 VITALS — DIASTOLIC BLOOD PRESSURE: 86 MMHG | BODY MASS INDEX: 37.12 KG/M2 | SYSTOLIC BLOOD PRESSURE: 114 MMHG | WEIGHT: 230 LBS

## 2019-03-08 DIAGNOSIS — R94.31 ABNORMAL ELECTROCARDIOGRAM: ICD-10-CM

## 2019-03-08 DIAGNOSIS — I45.6 WPW (WOLFF-PARKINSON-WHITE SYNDROME): ICD-10-CM

## 2019-03-08 DIAGNOSIS — I50.9 CONGESTIVE HEART FAILURE, UNSPECIFIED HF CHRONICITY, UNSPECIFIED HEART FAILURE TYPE (HCC): Primary | ICD-10-CM

## 2019-03-08 LAB
ALBUMIN SERPL-MCNC: 3.9 G/DL (ref 3.4–5)
ALBUMIN/GLOB SERPL: 1.1 {RATIO} (ref 0.8–1.7)
ALP SERPL-CCNC: 42 U/L (ref 45–117)
ALT SERPL-CCNC: 43 U/L (ref 16–61)
ANION GAP SERPL CALC-SCNC: 5 MMOL/L (ref 3–18)
AST SERPL-CCNC: 30 U/L (ref 15–37)
BASOPHILS # BLD: 0 K/UL (ref 0–0.1)
BASOPHILS NFR BLD: 0 % (ref 0–2)
BILIRUB SERPL-MCNC: 0.6 MG/DL (ref 0.2–1)
BUN SERPL-MCNC: 15 MG/DL (ref 7–18)
BUN/CREAT SERPL: 13 (ref 12–20)
CALCIUM SERPL-MCNC: 9.3 MG/DL (ref 8.5–10.1)
CHLORIDE SERPL-SCNC: 105 MMOL/L (ref 100–108)
CO2 SERPL-SCNC: 29 MMOL/L (ref 21–32)
CREAT SERPL-MCNC: 1.14 MG/DL (ref 0.6–1.3)
DIFFERENTIAL METHOD BLD: ABNORMAL
EOSINOPHIL # BLD: 0.1 K/UL (ref 0–0.4)
EOSINOPHIL NFR BLD: 2 % (ref 0–5)
ERYTHROCYTE [DISTWIDTH] IN BLOOD BY AUTOMATED COUNT: 12.8 % (ref 11.6–14.5)
GLOBULIN SER CALC-MCNC: 3.7 G/DL (ref 2–4)
GLUCOSE SERPL-MCNC: 86 MG/DL (ref 74–99)
HCT VFR BLD AUTO: 40.5 % (ref 36–48)
HGB BLD-MCNC: 13.8 G/DL (ref 13–16)
INR PPP: 1.2 (ref 0.8–1.2)
LYMPHOCYTES # BLD: 2.2 K/UL (ref 0.9–3.6)
LYMPHOCYTES NFR BLD: 47 % (ref 21–52)
MCH RBC QN AUTO: 32.2 PG (ref 24–34)
MCHC RBC AUTO-ENTMCNC: 34.1 G/DL (ref 31–37)
MCV RBC AUTO: 94.4 FL (ref 74–97)
MONOCYTES # BLD: 0.5 K/UL (ref 0.05–1.2)
MONOCYTES NFR BLD: 11 % (ref 3–10)
NEUTS SEG # BLD: 1.9 K/UL (ref 1.8–8)
NEUTS SEG NFR BLD: 40 % (ref 40–73)
PLATELET # BLD AUTO: 266 K/UL (ref 135–420)
PMV BLD AUTO: 10.1 FL (ref 9.2–11.8)
POTASSIUM SERPL-SCNC: 4.3 MMOL/L (ref 3.5–5.5)
PROT SERPL-MCNC: 7.6 G/DL (ref 6.4–8.2)
PROTHROMBIN TIME: 15.1 SEC (ref 11.5–15.2)
RBC # BLD AUTO: 4.29 M/UL (ref 4.7–5.5)
SODIUM SERPL-SCNC: 139 MMOL/L (ref 136–145)
WBC # BLD AUTO: 4.7 K/UL (ref 4.6–13.2)

## 2019-03-08 PROCEDURE — 85025 COMPLETE CBC W/AUTO DIFF WBC: CPT

## 2019-03-08 PROCEDURE — 85610 PROTHROMBIN TIME: CPT

## 2019-03-08 PROCEDURE — 80053 COMPREHEN METABOLIC PANEL: CPT

## 2019-03-08 PROCEDURE — 36415 COLL VENOUS BLD VENIPUNCTURE: CPT

## 2019-03-08 PROCEDURE — 71046 X-RAY EXAM CHEST 2 VIEWS: CPT

## 2019-03-08 NOTE — PATIENT INSTRUCTIONS
[facility]          Patient  EP Instructions                  1. You are scheduled to have a EP Study  on  March 12, 2019 , at 0100 pm.    Please check in at 1200 pm.    2. Please go to [facility] and park in the outpatient parking lot that is located around to the back of the hospital and enter through the [employer]. Once you enter through the [facility] check in with the  there. The  will either give you directions or assist you in getting to the cath holding area. 3.  You are not to eat or drink anything after midnight the night before your procedure. 4. Please continue to take your medications with a small sip of water on the morning of the procedure with the following exceptions:  Hold Eliquis 48 hour prior to procedure. Hold Lasix the morning of your procedure. 5. If you are diabetic, do not take your insulin/sugar pill the morning of the procedure. 6. We encourage families to wait in the waiting room on the first floor while the procedure is being done. The Doctor will come out and talk with you as soon as the procedure is over. 7. There is the possibility that you may spend the night in the hospital, depending on the results of the procedure. This will be determined after the procedure is done. 8.   If you or your family have any questions, please call our office Monday-Friday 9:00am         -4:30 pm , at [phone], and ask to speak to one of the nurses.

## 2019-03-08 NOTE — LETTER
3/8/2019 9:17 AM 
 
 
 
Yon Fischer 
[id number] 
1960 Insurance:  [employer] # _____________________ Proc Date: Tue 3/12                Proc Time:  1:00pm 
Performing MD : Dr. Gamal Lu Hospital:  [facility]                                            PCP Dr. Mike Pastor Scheduled with:  [facility]-email                                                        Date:3/8/2019 HP: 3/8      EKG: 3/8    Labs:______  CXR: _______  Orders:  3/8 Special Instructions:  _____________________________________________________ 
______________________________________________________________________ 
______________________________________________________________________ Date Faxed:   ______________   Pages Faxed: ___________________ The materials enclosed with this facsimile transmission are private and confidential and are the property of the sender. If you are not the intended recipient, be advised that any unauthorized use, disclosure, copying, distribution, or the taking of any action in reliance on the contents of this telecopied information is strictly prohibited. If you have received this in error, please immediately notify the sender via telephone to arrange for return of the forwarded documentation.

## 2019-03-08 NOTE — PROGRESS NOTES
History of Present Illness:  66year old male referred by Dr. Ginette Jacobs for new diagnosis of atrial fibrillation/flutter. He has a long history of palpitations without syncope. He has some mild dyspnea at times. His functional status is poor due to previous left ankle injury. He also has back issues and is planning a surgery fusion by Dr. Jes Dhaliwal in the coming weeks. Denies any chest pain. No PND, orthopnea or edema. He has some lower extremity paresthesias at times. He had a 30-day event monitor placed. It appears he has intermittent preexcitation versus rate dependent bundle branch block. He had some sustained episodes of wide complex tachycardia, which appeared to be anterograde conduction down the AV node and possible retrograde conduction through accessory pathway. Again, he has never had any syncope, his blood pressure is controlled. He had stopped smoking for a while and went back to it. He also has occasional alcohol use. He had a recent echocardiogram with normal left ventricular function and valves. Impression:  1. Wide complex tachycardia, concerning for left bundle branch block with aberrancy versus preexcitation. Suspected atrial fibrillation, paroxysmal.  Recently started Eliquis. 2. Chronic diastolic heart failure. 3. Palpitations without syncope. 4. History of asthma. 5. History of alcohol and tobacco use. 6. Family history of multiple heart problems. He cannot give me specifics, but it sounds like there may be rhythm issues. 7. Need for spine surgery with fusion by Dr. Jes Dhaliwal in the couple weeks. Plan: At this point I talked with him about his diagnosis of atrial fibrillation, and he is on Eliquis. I discussed stroke risk. At this point I would like to proceed with an EP study to rule out an accessory pathway. Depending upon the findings he may need ablation.   I also recommended a pharmacologic cardiac nuclear stress test in preparation for his surgery, as well as in preparation for an EP study. Continue with anticoagulation at this point. All questions answered. Total care time spent in reviewing the case, multiple EMR databases, physician notes, procedure notes, examining the patient, and documentation, is 60 minutes.      Discussed in details with patient. All questions were answered to their full satisfaction. Risk, benefit and alternatives were discussed. More than 50% time spent in counseling and coordination of care. Past Medical History:   Diagnosis Date    Asthma        Current Outpatient Medications   Medication Sig Dispense Refill    apixaban (ELIQUIS) 5 mg tablet Take 1 Tab by mouth two (2) times a day. 60 Tab 6    furosemide (LASIX) 20 mg tablet Take 1 Tab by mouth daily. 30 Tab 4    gabapentin (NEURONTIN) 300 mg capsule Take 1 Cap by mouth three (3) times daily. 90 Cap 2    albuterol (PROVENTIL HFA, VENTOLIN HFA, PROAIR HFA) 90 mcg/actuation inhaler Take 2 Puffs by inhalation every four (4) hours as needed. Social History   reports that he has been smoking cigarettes. He has been smoking about 0.50 packs per day. he has never used smokeless tobacco.   reports that he drinks alcohol. Family History  family history includes Asthma in his mother; Diabetes in his mother; Hypertension in his mother; No Known Problems in his father. Review of Systems  Except as stated above include:  Constitutional: Negative for fever, chills and malaise/fatigue. HEENT: No congestion or recent URI. Gastrointestinal: No nausea, vomiting, abdominal pain, bloody stools. Pulmonary:  Negative except as stated above. Cardiac:  Negative except as stated above. Musculoskeletal: Negative except as stated above. Neurological:  No localized symptoms. Skin:  Negative except as stated above. Psych:  Negative except as stated above. Endocrine:  Negative except as stated above.     PHYSICAL EXAM  BP Readings from Last 3 Encounters:   03/08/19 114/86   03/05/19 137/84   02/27/19 97/64     Pulse Readings from Last 3 Encounters:   02/27/19 62   01/29/19 82   01/11/19 (!) 57     Wt Readings from Last 3 Encounters:   03/08/19 104.3 kg (230 lb)   03/05/19 102.2 kg (225 lb 6.4 oz)   02/27/19 99.4 kg (219 lb 3.2 oz)     General:   Well developed, well groomed. Head/Neck:   No jugular venous distention     No carotid bruits. No evidence of xanthelasma. Lungs:   No respiratory distress. Clear bilaterally. Heart:    Regular rate and rhythm. Normal S1/S2. Palpation of heart with normal point of maximum impulse. No significant murmurs, rubs or gallops. Abdomen:   Soft and nontender. No palpable abdominal mass or bruits. Extremities:   Intact peripheral pulses. No significant edema. Neurological:   Alert and oriented to person, place, time. No focal neurological deficit visually.   Skin:   No obvious rash    Blood Pressure Metric:  Talita Gutierrez has been given the following recommendations today due to his elevated BP reading: controlled

## 2019-03-11 NOTE — H&P
Plan EP study. History of Present Illness:  66year old male referred by Dr. Jaqueline Butler for new diagnosis of atrial fibrillation/flutter. He has a long history of palpitations without syncope. He has some mild dyspnea at times. His functional status is poor due to previous left ankle injury. He also has back issues and is planning a surgery fusion by Dr. Jake Khan in the coming weeks. Denies any chest pain. No PND, orthopnea or edema. He has some lower extremity paresthesias at times. He had a 30-day event monitor placed. It appears he has intermittent preexcitation versus rate dependent bundle branch block. He had some sustained episodes of wide complex tachycardia, which appeared to be anterograde conduction down the AV node and possible retrograde conduction through accessory pathway. Again, he has never had any syncope, his blood pressure is controlled. He had stopped smoking for a while and went back to it. He also has occasional alcohol use. He had a recent echocardiogram with normal left ventricular function and valves.     Impression:  1. Wide complex tachycardia, concerning for left bundle branch block with aberrancy versus preexcitation. Suspected atrial fibrillation, paroxysmal.  Recently started Eliquis. 2. Chronic diastolic heart failure. 3. Palpitations without syncope. 4. History of asthma. 5. History of alcohol and tobacco use. 6. Family history of multiple heart problems. He cannot give me specifics, but it sounds like there may be rhythm issues. 7. Need for spine surgery with fusion by Dr. Jake Khan in the couple weeks.     Plan: At this point I talked with him about his diagnosis of atrial fibrillation, and he is on Eliquis. I discussed stroke risk. At this point I would like to proceed with an EP study to rule out an accessory pathway. Depending upon the findings he may need ablation.   I also recommended a pharmacologic cardiac nuclear stress test in preparation for his surgery, as well as in preparation for an EP study. Continue with anticoagulation at this point. All questions answered.     Total care time spent in reviewing the case, multiple EMR databases, physician notes, procedure notes, examining the patient, and documentation, is 60 minutes.      Discussed in details with patient. All questions were answered to their full satisfaction. Risk, benefit and alternatives were discussed. More than 50% time spent in counseling and coordination of care.             Past Medical History:   Diagnosis Date    Asthma                  Current Outpatient Medications   Medication Sig Dispense Refill    apixaban (ELIQUIS) 5 mg tablet Take 1 Tab by mouth two (2) times a day. 60 Tab 6    furosemide (LASIX) 20 mg tablet Take 1 Tab by mouth daily. 30 Tab 4    gabapentin (NEURONTIN) 300 mg capsule Take 1 Cap by mouth three (3) times daily. 90 Cap 2    albuterol (PROVENTIL HFA, VENTOLIN HFA, PROAIR HFA) 90 mcg/actuation inhaler Take 2 Puffs by inhalation every four (4) hours as needed.             Social History   reports that he has been smoking cigarettes. He has been smoking about 0.50 packs per day. he has never used smokeless tobacco.   reports that he drinks alcohol.     Family History  family history includes Asthma in his mother; Diabetes in his mother; Hypertension in his mother; No Known Problems in his father.     Review of Systems  Except as stated above include:  Constitutional: Negative for fever, chills and malaise/fatigue. HEENT: No congestion or recent URI. Gastrointestinal: No nausea, vomiting, abdominal pain, bloody stools. Pulmonary:  Negative except as stated above. Cardiac:  Negative except as stated above. Musculoskeletal: Negative except as stated above. Neurological:  No localized symptoms. Skin:  Negative except as stated above. Psych:  Negative except as stated above.   Endocrine:  Negative except as stated above.     PHYSICAL EXAM      BP Readings from Last 3 Encounters:   03/08/19 114/86   03/05/19 137/84   02/27/19 97/64          Pulse Readings from Last 3 Encounters:   02/27/19 62   01/29/19 82   01/11/19 (!) 57          Wt Readings from Last 3 Encounters:   03/08/19 104.3 kg (230 lb)   03/05/19 102.2 kg (225 lb 6.4 oz)   02/27/19 99.4 kg (219 lb 3.2 oz)      General:          Well developed, well groomed. Head/Neck:     No jugular venous distention                           No carotid bruits. No evidence of xanthelasma. Lungs:             No respiratory distress. Clear bilaterally. Heart:                          Regular rate and rhythm. Normal S1/S2. Palpation of heart with normal point of maximum impulse. No significant murmurs, rubs or gallops. Abdomen:        Soft and nontender. No palpable abdominal mass or bruits. Extremities:     Intact peripheral pulses. No significant edema. Neurological:   Alert and oriented to person, place, time. No focal neurological deficit visually.   Skin:                No obvious rash     Blood Pressure Metric:  Maribel Rivas has been given the following recommendations today due to his elevated BP reading: controlled             Electronically signed by Chelsy Alvarenga MD at 03/08/19 [id number]

## 2019-03-12 ENCOUNTER — ANESTHESIA EVENT (OUTPATIENT)
Dept: CARDIAC CATH/INVASIVE PROCEDURES | Age: 59
End: 2019-03-12
Payer: MEDICAID

## 2019-03-12 ENCOUNTER — HOSPITAL ENCOUNTER (OUTPATIENT)
Age: 59
Setting detail: OUTPATIENT SURGERY
Discharge: HOME | End: 2019-03-12
Attending: INTERNAL MEDICINE | Admitting: INTERNAL MEDICINE
Payer: MEDICAID

## 2019-03-12 ENCOUNTER — ANESTHESIA (OUTPATIENT)
Dept: CARDIAC CATH/INVASIVE PROCEDURES | Age: 59
End: 2019-03-12
Payer: MEDICAID

## 2019-03-12 VITALS
OXYGEN SATURATION: 100 % | RESPIRATION RATE: 7 BRPM | WEIGHT: 206 LBS | HEIGHT: 66 IN | DIASTOLIC BLOOD PRESSURE: 90 MMHG | BODY MASS INDEX: 33.11 KG/M2 | HEART RATE: 52 BPM | SYSTOLIC BLOOD PRESSURE: 144 MMHG

## 2019-03-12 DIAGNOSIS — I47.29 PAROXYSMAL VENTRICULAR TACHYCARDIA: ICD-10-CM

## 2019-03-12 PROCEDURE — C1730 CATH, EP, 19 OR FEW ELECT: HCPCS | Performed by: INTERNAL MEDICINE

## 2019-03-12 PROCEDURE — 77030022017 HC DRSG HEMO QCLOT ZMED -A: Performed by: INTERNAL MEDICINE

## 2019-03-12 PROCEDURE — 74011250636 HC RX REV CODE- 250/636: Performed by: INTERNAL MEDICINE

## 2019-03-12 PROCEDURE — C1894 INTRO/SHEATH, NON-LASER: HCPCS | Performed by: INTERNAL MEDICINE

## 2019-03-12 PROCEDURE — 93621 COMP EP EVL L PAC&REC C SINS: CPT | Performed by: INTERNAL MEDICINE

## 2019-03-12 PROCEDURE — 77030018729 HC ELECTRD DEFIB PAD CARD -B: Performed by: INTERNAL MEDICINE

## 2019-03-12 PROCEDURE — 93620 COMP EP EVL R AT VEN PAC&REC: CPT | Performed by: INTERNAL MEDICINE

## 2019-03-12 PROCEDURE — 74011250636 HC RX REV CODE- 250/636

## 2019-03-12 PROCEDURE — 76060000033 HC ANESTHESIA 1 TO 1.5 HR: Performed by: INTERNAL MEDICINE

## 2019-03-12 RX ORDER — LIDOCAINE HYDROCHLORIDE 20 MG/ML
INJECTION, SOLUTION EPIDURAL; INFILTRATION; INTRACAUDAL; PERINEURAL AS NEEDED
Status: DISCONTINUED | OUTPATIENT
Start: 2019-03-12 | End: 2019-03-12 | Stop reason: HOSPADM

## 2019-03-12 RX ORDER — FENTANYL CITRATE 50 UG/ML
INJECTION, SOLUTION INTRAMUSCULAR; INTRAVENOUS AS NEEDED
Status: DISCONTINUED | OUTPATIENT
Start: 2019-03-12 | End: 2019-03-12 | Stop reason: HOSPADM

## 2019-03-12 RX ORDER — MIDAZOLAM HYDROCHLORIDE 1 MG/ML
INJECTION, SOLUTION INTRAMUSCULAR; INTRAVENOUS AS NEEDED
Status: DISCONTINUED | OUTPATIENT
Start: 2019-03-12 | End: 2019-03-12 | Stop reason: HOSPADM

## 2019-03-12 RX ORDER — PROPOFOL 10 MG/ML
INJECTION, EMULSION INTRAVENOUS
Status: DISCONTINUED | OUTPATIENT
Start: 2019-03-12 | End: 2019-03-12 | Stop reason: HOSPADM

## 2019-03-12 RX ORDER — PROPOFOL 10 MG/ML
INJECTION, EMULSION INTRAVENOUS AS NEEDED
Status: DISCONTINUED | OUTPATIENT
Start: 2019-03-12 | End: 2019-03-12 | Stop reason: HOSPADM

## 2019-03-12 RX ORDER — SODIUM CHLORIDE, SODIUM LACTATE, POTASSIUM CHLORIDE, CALCIUM CHLORIDE 600; 310; 30; 20 MG/100ML; MG/100ML; MG/100ML; MG/100ML
INJECTION, SOLUTION INTRAVENOUS
Status: DISCONTINUED | OUTPATIENT
Start: 2019-03-12 | End: 2019-03-12 | Stop reason: HOSPADM

## 2019-03-12 RX ORDER — SODIUM CHLORIDE 0.9 % (FLUSH) 0.9 %
5-40 SYRINGE (ML) INJECTION EVERY 8 HOURS
Status: CANCELLED | OUTPATIENT
Start: 2019-03-12

## 2019-03-12 RX ORDER — METOPROLOL TARTRATE 25 MG/1
25 TABLET, FILM COATED ORAL 2 TIMES DAILY
Qty: 60 TAB | Refills: 0 | Status: SHIPPED | OUTPATIENT
Start: 2019-03-12 | End: 2019-04-30 | Stop reason: SDUPTHER

## 2019-03-12 RX ORDER — DEXTROSE 50 % IN WATER (D50W) INTRAVENOUS SYRINGE
25-50 AS NEEDED
Status: CANCELLED | OUTPATIENT
Start: 2019-03-12

## 2019-03-12 RX ORDER — LIDOCAINE HYDROCHLORIDE 10 MG/ML
INJECTION, SOLUTION EPIDURAL; INFILTRATION; INTRACAUDAL; PERINEURAL AS NEEDED
Status: DISCONTINUED | OUTPATIENT
Start: 2019-03-12 | End: 2019-03-12 | Stop reason: HOSPADM

## 2019-03-12 RX ORDER — SODIUM CHLORIDE 0.9 % (FLUSH) 0.9 %
5-40 SYRINGE (ML) INJECTION AS NEEDED
Status: CANCELLED | OUTPATIENT
Start: 2019-03-12

## 2019-03-12 RX ORDER — LIDOCAINE HYDROCHLORIDE 20 MG/ML
INJECTION, SOLUTION EPIDURAL; INFILTRATION; INTRACAUDAL; PERINEURAL AS NEEDED
Status: DISCONTINUED | OUTPATIENT
Start: 2019-03-12 | End: 2019-03-12

## 2019-03-12 RX ORDER — MAGNESIUM SULFATE 100 %
4 CRYSTALS MISCELLANEOUS AS NEEDED
Status: CANCELLED | OUTPATIENT
Start: 2019-03-12

## 2019-03-12 RX ORDER — INSULIN LISPRO 100 [IU]/ML
INJECTION, SOLUTION INTRAVENOUS; SUBCUTANEOUS ONCE
Status: CANCELLED | OUTPATIENT
Start: 2019-03-12 | End: 2019-03-12

## 2019-03-12 RX ORDER — FENTANYL CITRATE 50 UG/ML
INJECTION, SOLUTION INTRAMUSCULAR; INTRAVENOUS
Status: DISCONTINUED
Start: 2019-03-12 | End: 2019-03-12 | Stop reason: WASHOUT

## 2019-03-12 RX ADMIN — LIDOCAINE HYDROCHLORIDE 60 MG: 20 INJECTION, SOLUTION EPIDURAL; INFILTRATION; INTRACAUDAL; PERINEURAL at 10:03

## 2019-03-12 RX ADMIN — PROPOFOL 20 MG: 10 INJECTION, EMULSION INTRAVENOUS at 10:07

## 2019-03-12 RX ADMIN — FENTANYL CITRATE 50 MCG: 50 INJECTION, SOLUTION INTRAMUSCULAR; INTRAVENOUS at 10:18

## 2019-03-12 RX ADMIN — PROPOFOL 20 MG: 10 INJECTION, EMULSION INTRAVENOUS at 10:05

## 2019-03-12 RX ADMIN — PROPOFOL 100 MCG/KG/MIN: 10 INJECTION, EMULSION INTRAVENOUS at 10:08

## 2019-03-12 RX ADMIN — SODIUM CHLORIDE, SODIUM LACTATE, POTASSIUM CHLORIDE, CALCIUM CHLORIDE: 600; 310; 30; 20 INJECTION, SOLUTION INTRAVENOUS at 09:56

## 2019-03-12 RX ADMIN — MIDAZOLAM HYDROCHLORIDE 2 MG: 1 INJECTION, SOLUTION INTRAMUSCULAR; INTRAVENOUS at 09:56

## 2019-03-12 RX ADMIN — FENTANYL CITRATE 50 MCG: 50 INJECTION, SOLUTION INTRAMUSCULAR; INTRAVENOUS at 10:03

## 2019-03-12 NOTE — DISCHARGE INSTRUCTIONS
DISCHARGE SUMMARY from Nurse    PATIENT INSTRUCTIONS:    After general anesthesia or intravenous sedation, for 24 hours or while taking prescription Narcotics:  · Limit your activities  · Do not drive and operate hazardous machinery  · Do not make important personal or business decisions  · Do  not drink alcoholic beverages  · If you have not urinated within 8 hours after discharge, please contact your surgeon on call. Report the following to your surgeon:  · Excessive pain, swelling, redness or odor of or around the surgical area  · Temperature over 100.5  · Nausea and vomiting lasting longer than 4 hours or if unable to take medications  · Any signs of decreased circulation or nerve impairment to extremity: change in color, persistent  numbness, tingling, coldness or increase pain  · Any questions    What to do at Home:  Recommended activity: Activity as tolerated and no driving for today,     If you experience any of the following symptoms fever greater than 100.5, bleeding, swelling, numbness or tingling down your extremities, redness, puss from site, please follow up with emergency services. *  Please give a list of your current medications to your Primary Care Provider. *  Please update this list whenever your medications are discontinued, doses are      changed, or new medications (including over-the-counter products) are added. *  Please carry medication information at all times in case of emergency situations. These are general instructions for a healthy lifestyle:    No smoking/ No tobacco products/ Avoid exposure to second hand smoke  Surgeon General's Warning:  Quitting smoking now greatly reduces serious risk to your health.     Obesity, smoking, and sedentary lifestyle greatly increases your risk for illness    A healthy diet, regular physical exercise & weight monitoring are important for maintaining a healthy lifestyle    You may be retaining fluid if you have a history of heart failure or if you experience any of the following symptoms:  Weight gain of 3 pounds or more overnight or 5 pounds in a week, increased swelling in our hands or feet or shortness of breath while lying flat in bed. Please call your doctor as soon as you notice any of these symptoms; do not wait until your next office visit. Recognize signs and symptoms of STROKE:    F-face looks uneven    A-arms unable to move or move unevenly    S-speech slurred or non-existent    T-time-call 911 as soon as signs and symptoms begin-DO NOT go       Back to bed or wait to see if you get better-TIME IS BRAIN. Warning Signs of HEART ATTACK     Call 911 if you have these symptoms:   Chest discomfort. Most heart attacks involve discomfort in the center of the chest that lasts more than a few minutes, or that goes away and comes back. It can feel like uncomfortable pressure, squeezing, fullness, or pain.  Discomfort in other areas of the upper body. Symptoms can include pain or discomfort in one or both arms, the back, neck, jaw, or stomach.  Shortness of breath with or without chest discomfort.  Other signs may include breaking out in a cold sweat, nausea, or lightheadedness. Don't wait more than five minutes to call 911 - MINUTES MATTER! Fast action can save your life. Calling 911 is almost always the fastest way to get lifesaving treatment. Emergency Medical Services staff can begin treatment when they arrive -- up to an hour sooner than if someone gets to the hospital by car. The discharge information has been reviewed with the patient. The patient verbalized understanding. Discharge medications reviewed with the patient and appropriate educational materials and side effects teaching were provided. ___________________________________________________________________________________________________________________________________    No driving x 24 hours.     ELECTROPHYSIOLOGY STUDY/ABLATION DISCHARGE INSTRUCTIONS    Your Recovery: You had an electrophysiology study for a problem with your heartbeat. You may also have had a catheter ablation to try to correct the problem. You may have swelling, bruising, or a small lump around the site where the catheters went into your body. These should go away in 3 to 4 weeks. Going home:    · Do not drive for 24 hours  · You will need someone to drive you home  · You will be given more specific instructions about recovering from your procedure, including activity and when you may return to work. · Call your doctor if you have any questions or concerns. After your ablation:    You can expect to feel brief palpitations for up to 3 months. If your palpitations return, please contact Dr. Clara Selby office.       Patient: ________________________   Date: 3/12/2019

## 2019-03-12 NOTE — Clinical Note
TRANSFER - IN REPORT:  
 
Verbal report received from: [facility]. Report consisted of patient's Situation, Background, Assessment and  
Recommendations(SBAR). Opportunity for questions and clarification was provided. Assessment completed upon patient's arrival to unit and care assumed. Patient transported with a Cardiac Cath Tech / Patient Care Tech.

## 2019-03-12 NOTE — ANESTHESIA POSTPROCEDURE EVALUATION
Procedure(s):  EP STUDY COMPLETE  Lt Atrial Pace & Record During Ep Study.     Anesthesia Post Evaluation      Multimodal analgesia: multimodal analgesia used between 6 hours prior to anesthesia start to PACU discharge  Patient location during evaluation: bedside  Patient participation: complete - patient participated  Level of consciousness: awake  Pain score: 0  Airway patency: patent  Anesthetic complications: no  Cardiovascular status: acceptable  Respiratory status: acceptable  Hydration status: acceptable  Post anesthesia nausea and vomiting:  none      Visit Vitals  /89   Pulse (!) 50   Resp 26   Ht 5' 6\" (1.676 m)   Wt 93.4 kg (206 lb)   SpO2 100%   BMI 33.25 kg/m²

## 2019-03-12 NOTE — PROGRESS NOTES
3336: Pt brought to cath holding bed 6 ambulatory with cane. Pt provided with hospital gown, belongings bag, and socks. Informed Dr. Tavon Butler that patient took Eliquis yesterday morning. OK to proceed with procedure per Dr. Tavon Butler. 2126: Pt alert and oriented times four. No complaints of pain noted at this time. PIV x 2 started. Groin clipped and prepped. Admission database completed. Pt ready for ordered procedure. 1231: Pt taken to lab for ordered procedure. Report given to [facility] [place]     1051: Pt returned to cath holding post procedure. Pt arousable by voice without complication. Pt denies pain at this time. Groin site CDI. No signs of bleeding or hematoma noted. Pt educated on post procedure care. Family brought to bedside per pt request.     [phone]: Pt resting on stretcher with eyes closed and in NAD at this time. Groin site remains CDI. No complaints of pain noted. 1237: Pt continues to rest on stretcher in NAD. Pt denies pain at this time. Groin site remains CDI. No signs of bleeding or hematoma noted at this time. 1300: Pt ambulated to restroom without difficulty    1305: Pt returned to bay 6 without difficulty. Groin site remains CDI. No hematoma or bleeding noted at this time. 1315: Pt provided discharge instructions. All questions answered and pt verbalized understanding. PIV x 2 removed. No hematoma or bleeding noted at this time. Procedure site within normal limits. Pt escorted to front of building for discharge.

## 2019-03-12 NOTE — ANESTHESIA PREPROCEDURE EVALUATION
Anesthetic History   No history of anesthetic complications            Review of Systems / Medical History  Patient summary reviewed, nursing notes reviewed and pertinent labs reviewed    Pulmonary            Asthma : poorly controlled       Neuro/Psych   Within defined limits           Cardiovascular              PAD    Exercise tolerance: <4 METS     GI/Hepatic/Renal  Within defined limits              Endo/Other        Morbid obesity     Other Findings              Physical Exam    Airway  Mallampati: II  TM Distance: 4 - 6 cm  Neck ROM: normal range of motion, short neck   Mouth opening: Normal     Cardiovascular    Rhythm: regular  Rate: normal         Dental    Dentition: Caps/crowns     Pulmonary  Breath sounds clear to auscultation               Abdominal         Other Findings            Anesthetic Plan    ASA: 3  Anesthesia type: MAC            Anesthetic plan and risks discussed with: Patient and Spouse

## 2019-03-12 NOTE — Clinical Note
TRANSFER - OUT REPORT:  
 
Verbal report given to: Nikolas Yao. Report consisted of patient's Situation, Background, Assessment and  
Recommendations(SBAR). Opportunity for questions and clarification was provided. Patient transported with a Cardiac Cath Tech / Patient Care Tech. Patient transported to: [address].

## 2019-03-13 ENCOUNTER — TELEPHONE (OUTPATIENT)
Dept: CARDIOLOGY CLINIC | Age: 59
End: 2019-03-13

## 2019-03-13 NOTE — TELEPHONE ENCOUNTER
Attempted to contact patient to schedule him to see Dr. Tavon Butler. Unable to leave message, as mailbox is full. He needs to see Dr. Tavon Butler on either 3/21 or 4/4. I will try to reach him again later. Brigitte Yu  Male, 66 y.o., 1960  Weight:   93.4 kg (206 lb)  Phone:   [phone] (M)  PCP:   Serena Parsons MD  MRN:   [id number]  MyChart:   Pending  Next Appt (With Me)  None  Next Appt (Any Provider)  03/21/2019     Message   Received: [facility]   Message Contents   MD Alejo David             Please add patient for followup to see me in 1-2 weeks, ok to overbook, to discuss ablation.  thx

## 2019-03-18 ENCOUNTER — DOCUMENTATION ONLY (OUTPATIENT)
Dept: ORTHOPEDIC SURGERY | Age: 59
End: 2019-03-18

## 2019-03-18 NOTE — PROGRESS NOTES
Spoke to patients PCP. He is there asking for pain medications. Explained he has to stop smoking and have cardiac clearance. Would recommend a PM referral for chronic pain and a trial of either Topamax, Cymbalta or Lyrica as he has failed gabapentin.

## 2019-03-21 ENCOUNTER — OFFICE VISIT (OUTPATIENT)
Dept: CARDIOLOGY CLINIC | Age: 59
End: 2019-03-21

## 2019-03-21 VITALS
HEIGHT: 66 IN | BODY MASS INDEX: 35.52 KG/M2 | HEART RATE: 95 BPM | SYSTOLIC BLOOD PRESSURE: 128 MMHG | DIASTOLIC BLOOD PRESSURE: 66 MMHG | WEIGHT: 221 LBS

## 2019-03-21 DIAGNOSIS — R94.31 ABNORMAL ELECTROCARDIOGRAM: ICD-10-CM

## 2019-03-21 DIAGNOSIS — I50.32 CHRONIC DIASTOLIC CONGESTIVE HEART FAILURE (HCC): ICD-10-CM

## 2019-03-21 DIAGNOSIS — I47.29 PAROXYSMAL VENTRICULAR TACHYCARDIA: Primary | ICD-10-CM

## 2019-03-21 DIAGNOSIS — I48.92 ATRIAL FLUTTER, PAROXYSMAL (HCC): ICD-10-CM

## 2019-03-21 DIAGNOSIS — I47.1 SVT (SUPRAVENTRICULAR TACHYCARDIA) (HCC): ICD-10-CM

## 2019-03-21 RX ORDER — MONTELUKAST SODIUM 10 MG/1
10 TABLET ORAL DAILY
COMMUNITY
End: 2019-05-16 | Stop reason: SDUPTHER

## 2019-03-21 RX ORDER — DULOXETIN HYDROCHLORIDE 30 MG/1
30 CAPSULE, DELAYED RELEASE ORAL DAILY
COMMUNITY
End: 2019-05-16 | Stop reason: SDUPTHER

## 2019-03-21 NOTE — PROGRESS NOTES
HISTORY OF PRESENT ILLNESS  Mila Mccain is a 66 y.o. male. Shortness of Breath   The history is provided by the patient. This is a chronic problem. The problem occurs intermittently. The problem has not changed since onset. Pertinent negatives include no fever, no ear pain, no neck pain, no cough, no sputum production, no hemoptysis, no wheezing, no PND, no orthopnea, no vomiting, no rash, no leg swelling and no claudication. Associated medical issues include heart failure. Palpitations    The history is provided by the patient. This is a chronic problem. The problem has not changed since onset. The problem occurs every several days. Associated symptoms include shortness of breath. Pertinent negatives include no diaphoresis, no fever, no claudication, no orthopnea, no PND, no nausea, no vomiting, no dizziness, no weakness, no cough, no hemoptysis and no sputum production. Review of Systems   Constitutional: Negative for chills, diaphoresis, fever and weight loss. HENT: Negative for ear pain and hearing loss. Eyes: Negative for blurred vision. Respiratory: Positive for shortness of breath. Negative for cough, hemoptysis, sputum production, wheezing and stridor. Cardiovascular: Positive for palpitations. Negative for orthopnea, claudication, leg swelling and PND. Gastrointestinal: Negative for heartburn, nausea and vomiting. Musculoskeletal: Negative for myalgias and neck pain. Skin: Negative for rash. Neurological: Negative for dizziness, tingling, tremors, focal weakness, loss of consciousness and weakness. Psychiatric/Behavioral: Negative for depression and suicidal ideas.      Family History   Problem Relation Age of Onset    Diabetes Mother     Hypertension Mother     Asthma Mother     No Known Problems Father        Past Medical History:   Diagnosis Date    Asthma        Past Surgical History:   Procedure Laterality Date    WA COMPRE ELECTROPHYSIOL XM W/LEFT ATRIAL PACNG/REC N/A 3/12/2019    Lt Atrial Pace & Record During Ep Study performed by Sintia Kauffman MD at [facility]   [address] INDUCTION N/A 3/12/2019    EP STUDY COMPLETE performed by Sintia Kauffman MD at [facility]       Social History     Tobacco Use    Smoking status: Current Every Day Smoker     Packs/day: 0.50     Types: Cigarettes    Smokeless tobacco: Never Used   Substance Use Topics    Alcohol use: Yes     Frequency: 2-3 times a week     Drinks per session: 3 or 4     Binge frequency: Never       No Known Allergies    Outpatient Medications Marked as Taking for the 3/21/19 encounter (Office Visit) with Isaac Santiago MD   Medication Sig Dispense Refill    montelukast (SINGULAIR) 10 mg tablet Take 10 mg by mouth daily.  DULoxetine (CYMBALTA) 30 mg capsule Take 30 mg by mouth daily.  metoprolol tartrate (LOPRESSOR) 25 mg tablet Take 1 Tab by mouth two (2) times a day. 60 Tab 0    apixaban (ELIQUIS) 5 mg tablet Take 1 Tab by mouth two (2) times a day. 60 Tab 6    furosemide (LASIX) 20 mg tablet Take 1 Tab by mouth daily. 30 Tab 4    albuterol (PROVENTIL HFA, VENTOLIN HFA, PROAIR HFA) 90 mcg/actuation inhaler Take 2 Puffs by inhalation every four (4) hours as needed. Visit Vitals  /66   Pulse 95   Ht 5' 6\" (1.676 m)   Wt 100.2 kg (221 lb)   BMI 35.67 kg/m²     Physical Exam   Constitutional: He is oriented to person, place, and time. He appears well-developed and well-nourished. No distress. HENT:   Head: Atraumatic. Mouth/Throat: No oropharyngeal exudate. Eyes: Conjunctivae are normal. No scleral icterus. Neck: Normal range of motion. Neck supple. No JVD present. No tracheal deviation present. No thyromegaly present. Cardiovascular: Normal rate and regular rhythm. Exam reveals no gallop. No murmur heard. Pulmonary/Chest: Effort normal and breath sounds normal. No stridor. He has no wheezes. He has no rales. Abdominal: Soft. There is no tenderness. There is no rebound and no guarding. Musculoskeletal: Normal range of motion. He exhibits edema (mild ble edema). He exhibits no tenderness. Neurological: He is alert and oriented to person, place, and time. He exhibits normal muscle tone. Skin: Skin is warm. He is not diaphoretic. Psychiatric: He has a normal mood and affect. His behavior is normal.     ekg sinus rhythm with WPW  ASSESSMENT and PLAN    ICD-10-CM ICD-9-CM    1. Paroxysmal ventricular tachycardia (HCC) I47.2 427.1    2. Chronic diastolic congestive heart failure (HCC) I50.32 428.32      428.0    3. Abnormal electrocardiogram R94.31 794.31    4. SVT (supraventricular tachycardia) (HCC) I47.1 427.89    5. Atrial flutter, paroxysmal (HCC) I48.92 427.32     on eliquis     No orders of the defined types were placed in this encounter. Follow-up and Dispositions    · Return in about 2 months (around 5/21/2019). reviewed diet, exercise and weight control  very strongly urged to quit smoking to reduce cardiovascular risk. Patient is a 61-year-old male seen for worsening exertional dyspnea and abnormal EKG. Holter monitor revealed atrial flutter and intermittent episodes of wide-complex tachycardia. He subsequently underwent an EP study which revealed SVT (AVNRT) and atrial flutter. Currently on Eliquis for anticoagulation. He has a follow-up appointment with Dr. Daren Sanchez to discuss further course of action.

## 2019-03-21 NOTE — PROGRESS NOTES
1. Have you been to the ER, urgent care clinic since your last visit? Hospitalized since your last visit? No    2. Have you seen or consulted any other health care providers outside of the [address] [place] [address] since your last visit? Include any pap smears or colon screening.  No

## 2019-04-04 ENCOUNTER — OFFICE VISIT (OUTPATIENT)
Dept: CARDIOLOGY CLINIC | Age: 59
End: 2019-04-04

## 2019-04-04 ENCOUNTER — DOCUMENTATION ONLY (OUTPATIENT)
Dept: CARDIOLOGY CLINIC | Age: 59
End: 2019-04-04

## 2019-04-04 VITALS
BODY MASS INDEX: 35.03 KG/M2 | HEIGHT: 66 IN | OXYGEN SATURATION: 96 % | WEIGHT: 218 LBS | SYSTOLIC BLOOD PRESSURE: 104 MMHG | DIASTOLIC BLOOD PRESSURE: 60 MMHG | HEART RATE: 56 BPM

## 2019-04-04 DIAGNOSIS — I48.92 ATRIAL FLUTTER, PAROXYSMAL (HCC): ICD-10-CM

## 2019-04-04 DIAGNOSIS — I47.1 SVT (SUPRAVENTRICULAR TACHYCARDIA) (HCC): ICD-10-CM

## 2019-04-04 DIAGNOSIS — I45.6 WPW (WOLFF-PARKINSON-WHITE SYNDROME): ICD-10-CM

## 2019-04-04 DIAGNOSIS — I50.32 CHRONIC DIASTOLIC CONGESTIVE HEART FAILURE (HCC): ICD-10-CM

## 2019-04-04 DIAGNOSIS — I47.29 PAROXYSMAL VENTRICULAR TACHYCARDIA: Primary | ICD-10-CM

## 2019-04-04 NOTE — PROGRESS NOTES
Prakash Alcaraz presents today for Chief Complaint Patient presents with  Irregular Heart Beat  
  1-2 week follow up  Chest Pain  
  intermittent tightness, right sided, non-radiating  Shortness of Breath  
  exertion  Palpitations  
  racing and fluttering Yon Romero preferred language for health care discussion is english/other. Is someone accompanying this pt? wife Is the patient using any DME equipment during [address]? no 
 
Depression Screening: 
3 most recent PHQ Screens 3/5/2019 Little interest or pleasure in doing things Not at all Feeling down, depressed, irritable, or hopeless Not at all Total Score PHQ 2 0 Learning Assessment: 
No flowsheet data found. Abuse Screening: No flowsheet data found. Fall Risk No flowsheet data found. Pt currently taking Anticoagulant therapy? Eliquis Coordination of Care: 1. Have you been to the ER, urgent care clinic since your last visit? Hospitalized since your last visit? 3/12for EP study 2. Have you seen or consulted any other health care providers outside of the [address] [place] [address] since your last visit? Include any pap smears or colon screening.  no

## 2019-04-04 NOTE — PATIENT INSTRUCTIONS
[facility] Patient  EP Instructions 1. You are scheduled to have a SVT/A Flutter Ablation on  04/19/2019 , at 9:15 am.    Please check in at 8:15 am. 
 
2. Please go to [facility] and park in the outpatient parking lot that is located around to the back of the hospital and enter through the [facility] building. Once you enter through the [facility] check in with the  there. The  will either give you directions or assist you in getting to the cath holding area. 3.  You are not to eat or drink anything after midnight the night before your                   procedure. 4. Please continue to take your medications with a small sip of water on the morning of the procedure with the following exceptions:  Hold Eliquis for 24 hours prior to procedure and Do NOT take Lasix/ Furosemide the morning of the procedure. 5. If you are diabetic, do not take your insulin/sugar pill the morning of the procedure. 6. We encourage families to wait in the waiting room on the first floor while the procedure is being done. The Doctor will come out and talk with you as soon as the procedure is over. 7. There is the possibility that you may spend the night in the hospital, depending on the results of the procedure. This will be determined after the procedure is done. 8.   If you or your family have any questions, please call our office Monday-Friday 9:00am  
      -4:30 pm , at [phone], and ask to speak to one of the nurses. Pharm nuc Stress test at Dr Radha Devries office [address] [facility] [address] [address] [address] 
  
Ph: [phone] 
  
Your appointment: Tomorrow, Friday 4/5/19 @ 7:30 am  
  
**Instructions** Please Nothing to eat or drink after Midnight Wear comfortable clothes and shoes Do not take Medicine the morning of testing bring your bottles of medicine with you

## 2019-04-04 NOTE — PROGRESS NOTES
Spoke to Deon Foreman schedule Pharm nuc Stress test at [facility] office  [address] [facility] [address]    Ph: [phone]    Your appointment: Tomorrow, Friday 4/5/19 @ 7:30 am     **Instructions**  Please Nothing to eat or drink after Midnight   Wear comfortable clothes and shoes  Do not take Medicine the morning of testing bring your bottles of medicine with you    This has been fully explained to the patient, who indicates understanding.   Medardo Prince

## 2019-04-04 NOTE — PROGRESS NOTES
History of Present Illness:  A 66 y.o. man here for follow up. He was initially referred by Dr. Kristal Ledezma for a new diagnosis of atrial fibrillation flutter wide complex tachycardia. He underwent an electrophysiology study on March 12, 2019. He had inducible AVNRT as well as typical atrial flutter and the atrial fibrillation. All were self terminated. There was no accessory pathway. He was started on a beta blocker. He continues to have some occasional episodes of palpitations and very rare chest pain. He still is trying to get surgery for his spine which has been postponed. No syncope. No PND, orthopnea or edema. Impression: 1. EP study March 2019 with inducible AVNRT atrial flutter and atrial fibrillation, no accessory pathway. 2. History of wide complex tachycardia without accessory pathway by EP March 2019, underlying left bundle branch block and aberrancy likely cause. 3. Chronic diastolic heart failure. 4. Palpitations without syncope. 5. Chest pain. 6. History of asthma. 7. History of alcohol and tobacco use. 8. Family history of multiple heart problems. 9. Need for spine surgery with fusion by Dr. Hal Tyson due to chronic pain currently using a walker. Plan: At this point, I am going to recommend that we get a pharmacologic cardiac nuclear stress test.  I am going to schedule him for an ablation of his AVNRT as well as atrial flutter. I would not pursue atrial fibrillation ablation at this point. I will make arrangements. Obviously, if he has an abnormal stress test, he may require a heart catheterization by Dr. Kristal Ledezma as well. Pertinent risks, benefits, alternatives discussed. Plan moderate sedation if anesthesiologist is not available. Risks include emergent intubation as well as possibly inability to intubate. There can be exacerbation of lung and heart disease including but not limited to heart failure and COPD/asthma. Risks include but are not limited to acute and chronic pain, infection, bleeding, deep venous thrombosis with chronic swelling of extremity, pulmonary embolism, anesthesia reactions, intubation,  pneumothorax, hemothorax, perforation of the heart muscle/chambers, emergent open heart surgery with bypass/valve replacement, pacemaker, AICD, incessant VT, valve damage, nerve damage, and death. Possible damage to the throat and structures around the throat and esophagus and stomach. There can be a prolonged hospitalization with brain damage and reduced or compromised long term mobility. By stating these are possible risks, this does not exclude the potential for additional risks not named here. Past Medical History:  
Diagnosis Date  Asthma Current Outpatient Medications Medication Sig Dispense Refill  montelukast (SINGULAIR) 10 mg tablet Take 10 mg by mouth daily.  DULoxetine (CYMBALTA) 30 mg capsule Take 30 mg by mouth daily.  metoprolol tartrate (LOPRESSOR) 25 mg tablet Take 1 Tab by mouth two (2) times a day. 60 Tab 0  
 apixaban (ELIQUIS) 5 mg tablet Take 1 Tab by mouth two (2) times a day. 60 Tab 6  furosemide (LASIX) 20 mg tablet Take 1 Tab by mouth daily. 30 Tab 4  
 gabapentin (NEURONTIN) 300 mg capsule Take 1 Cap by mouth three (3) times daily. 90 Cap 2  
 albuterol (PROVENTIL HFA, VENTOLIN HFA, PROAIR HFA) 90 mcg/actuation inhaler Take 2 Puffs by inhalation every four (4) hours as needed. Social History 
 reports that he has been smoking cigarettes. He has been smoking about 0.50 packs per day. He has never used smokeless tobacco. 
 reports that he drinks alcohol. Family History 
family history includes Asthma in his mother; Diabetes in his mother; Hypertension in his mother; No Known Problems in his father. Review of Systems Except as stated above include: 
Constitutional: Negative for fever, chills and malaise/fatigue. HEENT: No congestion or recent URI. Gastrointestinal: No nausea, vomiting, abdominal pain, bloody stools. Pulmonary:  Negative except as stated above. Cardiac:  Negative except as stated above. Musculoskeletal: Negative except as stated above. Neurological:  No localized symptoms. Skin:  Negative except as stated above. Psych:  Negative except as stated above. Endocrine:  Negative except as stated above. PHYSICAL EXAM 
BP Readings from Last 3 Encounters:  
03/21/19 128/66  
03/12/19 144/90  
03/08/19 114/86 Pulse Readings from Last 3 Encounters:  
03/21/19 95  
03/12/19 (!) 52  
02/27/19 62 Wt Readings from Last 3 Encounters:  
03/21/19 100.2 kg (221 lb)  
03/12/19 93.4 kg (206 lb) 03/08/19 104.3 kg (230 lb) General:   Well developed, well groomed. Head/Neck:   No jugular venous distention No carotid bruits. No evidence of xanthelasma. Lungs:   No respiratory distress. Clear bilaterally. Heart:    Regular rate and rhythm. Normal S1/S2. Palpation of heart with normal point of maximum impulse. No significant murmurs, rubs or gallops. Abdomen:   Soft and nontender. No palpable abdominal mass or bruits. Extremities:   Intact peripheral pulses. No significant edema. Neurological:   Alert and oriented to person, place, time. No focal neurological deficit visually. Skin:   No obvious rash Blood Pressure Metric: 
Kings Patel has been given the following recommendations today due to his elevated BP reading: monitor

## 2019-04-12 ENCOUNTER — HOSPITAL ENCOUNTER (OUTPATIENT)
Dept: PREADMISSION TESTING | Age: 59
Discharge: HOME | End: 2019-04-12
Payer: MEDICAID

## 2019-04-12 LAB
ALBUMIN SERPL-MCNC: 3.9 G/DL (ref 3.4–5)
ALBUMIN/GLOB SERPL: 1 {RATIO} (ref 0.8–1.7)
ALP SERPL-CCNC: 45 U/L (ref 45–117)
ALT SERPL-CCNC: 56 U/L (ref 16–61)
ANION GAP SERPL CALC-SCNC: 4 MMOL/L (ref 3–18)
AST SERPL-CCNC: 31 U/L (ref 15–37)
BASOPHILS # BLD: 0 K/UL (ref 0–0.1)
BASOPHILS NFR BLD: 1 % (ref 0–2)
BILIRUB SERPL-MCNC: 0.5 MG/DL (ref 0.2–1)
BUN SERPL-MCNC: 9 MG/DL (ref 7–18)
BUN/CREAT SERPL: 9 (ref 12–20)
CALCIUM SERPL-MCNC: 9.7 MG/DL (ref 8.5–10.1)
CHLORIDE SERPL-SCNC: 107 MMOL/L (ref 100–108)
CO2 SERPL-SCNC: 30 MMOL/L (ref 21–32)
CREAT SERPL-MCNC: 1.04 MG/DL (ref 0.6–1.3)
DIFFERENTIAL METHOD BLD: ABNORMAL
EOSINOPHIL # BLD: 0.1 K/UL (ref 0–0.4)
EOSINOPHIL NFR BLD: 3 % (ref 0–5)
ERYTHROCYTE [DISTWIDTH] IN BLOOD BY AUTOMATED COUNT: 12.9 % (ref 11.6–14.5)
GLOBULIN SER CALC-MCNC: 4 G/DL (ref 2–4)
GLUCOSE SERPL-MCNC: 81 MG/DL (ref 74–99)
HCT VFR BLD AUTO: 41.1 % (ref 36–48)
HGB BLD-MCNC: 13.5 G/DL (ref 13–16)
INR PPP: 1.1 (ref 0.8–1.2)
LYMPHOCYTES # BLD: 2.3 K/UL (ref 0.9–3.6)
LYMPHOCYTES NFR BLD: 54 % (ref 21–52)
MCH RBC QN AUTO: 31.1 PG (ref 24–34)
MCHC RBC AUTO-ENTMCNC: 32.8 G/DL (ref 31–37)
MCV RBC AUTO: 94.7 FL (ref 74–97)
MONOCYTES # BLD: 0.3 K/UL (ref 0.05–1.2)
MONOCYTES NFR BLD: 7 % (ref 3–10)
NEUTS SEG # BLD: 1.4 K/UL (ref 1.8–8)
NEUTS SEG NFR BLD: 35 % (ref 40–73)
PLATELET # BLD AUTO: 250 K/UL (ref 135–420)
PMV BLD AUTO: 9.6 FL (ref 9.2–11.8)
POTASSIUM SERPL-SCNC: 4.4 MMOL/L (ref 3.5–5.5)
PROT SERPL-MCNC: 7.9 G/DL (ref 6.4–8.2)
PROTHROMBIN TIME: 14 SEC (ref 11.5–15.2)
RBC # BLD AUTO: 4.34 M/UL (ref 4.7–5.5)
SODIUM SERPL-SCNC: 141 MMOL/L (ref 136–145)
WBC # BLD AUTO: 4 K/UL (ref 4.6–13.2)

## 2019-04-12 PROCEDURE — 85610 PROTHROMBIN TIME: CPT

## 2019-04-12 PROCEDURE — 85025 COMPLETE CBC W/AUTO DIFF WBC: CPT

## 2019-04-12 PROCEDURE — 36415 COLL VENOUS BLD VENIPUNCTURE: CPT

## 2019-04-12 PROCEDURE — 80053 COMPREHEN METABOLIC PANEL: CPT

## 2019-04-15 NOTE — H&P
Plan AVNRT and atrial flutter ablation. Pertinent risks, benefits, alternatives discussed. Plan moderate sedation if anesthesiologist is not available. Risks include emergent intubation as well as possibly inability to intubate. There can be exacerbation of lung and heart disease including but not limited to heart failure and COPD/asthma. Risks include but are not limited to acute and chronic pain, infection, bleeding, deep venous thrombosis with chronic swelling of extremity, pulmonary embolism, anesthesia reactions, intubation,  pneumothorax, hemothorax, perforation of the heart muscle/chambers, emergent open heart surgery with bypass/valve replacement, pacemaker, AICD, incessant VT, valve damage, nerve damage, and death. Possible damage to the throat and structures around the throat and esophagus and stomach. There can be a prolonged hospitalization with brain damage and reduced or compromised long term mobility. By stating these are possible risks, this does not exclude the potential for additional risks not named here. History of Present Illness:  A 66 y.o. man here for follow up. He was initially referred by Dr. Isa George for a new diagnosis of atrial fibrillation flutter wide complex tachycardia. He underwent an electrophysiology study on March 12, 2019. He had inducible AVNRT as well as typical atrial flutter and the atrial fibrillation. All were self terminated. There was no accessory pathway. He was started on a beta blocker. He continues to have some occasional episodes of palpitations and very rare chest pain. He still is trying to get surgery for his spine which has been postponed. No syncope. No PND, orthopnea or edema.   
  
Impression: 1. EP study March 2019 with inducible AVNRT atrial flutter and atrial fibrillation, no accessory pathway.    
2. History of wide complex tachycardia without accessory pathway by EP March 2019, underlying left bundle branch block and aberrancy likely cause. 3. Chronic diastolic heart failure. 4. Palpitations without syncope. 5. Chest pain. 6. History of asthma. 7. History of alcohol and tobacco use. 8. Family history of multiple heart problems. 9. Need for spine surgery with fusion by Dr. Kong Hawk due to chronic pain currently using a walker.   
  
Plan: At this point, I am going to recommend that we get a pharmacologic cardiac nuclear stress test.  I am going to schedule him for an ablation of his AVNRT as well as atrial flutter. I would not pursue atrial fibrillation ablation at this point. I will make arrangements. Obviously, if he has an abnormal stress test, he may require a heart catheterization by Dr. Alexsandra Flroes as well.   
  
Pertinent risks, benefits, alternatives discussed. Plan moderate sedation if anesthesiologist is not available. Risks include emergent intubation as well as possibly inability to intubate. There can be exacerbation of lung and heart disease including but not limited to heart failure and COPD/asthma. 
  
Risks include but are not limited to acute and chronic pain, infection, bleeding, deep venous thrombosis with chronic swelling of extremity, pulmonary embolism, anesthesia reactions, intubation,  pneumothorax, hemothorax, perforation of the heart muscle/chambers, emergent open heart surgery with bypass/valve replacement, pacemaker, AICD, incessant VT, valve damage, nerve damage, and death. Possible damage to the throat and structures around the throat and esophagus and stomach. There can be a prolonged hospitalization with brain damage and reduced or compromised long term mobility. By stating these are possible risks, this does not exclude the potential for additional risks not named here. 
  
    
Past Medical History:  
Diagnosis Date  Asthma    
  
  
      
Current Outpatient Medications Medication Sig Dispense Refill  montelukast (SINGULAIR) 10 mg tablet Take 10 mg by mouth daily.      
 DULoxetine (CYMBALTA) 30 mg capsule Take 30 mg by mouth daily.      
 metoprolol tartrate (LOPRESSOR) 25 mg tablet Take 1 Tab by mouth two (2) times a day. 60 Tab 0  
 apixaban (ELIQUIS) 5 mg tablet Take 1 Tab by mouth two (2) times a day. 60 Tab 6  furosemide (LASIX) 20 mg tablet Take 1 Tab by mouth daily. 30 Tab 4  
 gabapentin (NEURONTIN) 300 mg capsule Take 1 Cap by mouth three (3) times daily. 90 Cap 2  
 albuterol (PROVENTIL HFA, VENTOLIN HFA, PROAIR HFA) 90 mcg/actuation inhaler Take 2 Puffs by inhalation every four (4) hours as needed.      
  
  
Social History 
 reports that he has been smoking cigarettes. He has been smoking about 0.50 packs per day. He has never used smokeless tobacco. 
 reports that he drinks alcohol. 
  
Family History 
family history includes Asthma in his mother; Diabetes in his mother; Hypertension in his mother; No Known Problems in his father. 
  
Review of Systems Except as stated above include: 
Constitutional: Negative for fever, chills and malaise/fatigue. HEENT: No congestion or recent URI. Gastrointestinal: No nausea, vomiting, abdominal pain, bloody stools. Pulmonary:  Negative except as stated above. Cardiac:  Negative except as stated above. Musculoskeletal: Negative except as stated above. Neurological:  No localized symptoms. Skin:  Negative except as stated above. Psych:  Negative except as stated above. Endocrine:  Negative except as stated above. 
  
PHYSICAL EXAM 
   
BP Readings from Last 3 Encounters:  
03/21/19 128/66  
03/12/19 144/90  
03/08/19 114/86  
  
   
Pulse Readings from Last 3 Encounters:  
03/21/19 95  
03/12/19 (!) 52  
02/27/19 62  
  
   
Wt Readings from Last 3 Encounters:  
03/21/19 100.2 kg (221 lb)  
03/12/19 93.4 kg (206 lb) 03/08/19 104.3 kg (230 lb)  
  
General:          Well developed, well groomed. Head/Neck:     No jugular venous distention No carotid bruits. No evidence of xanthelasma. Lungs:             No respiratory distress. Clear bilaterally. Heart:                          Regular rate and rhythm. Normal S1/S2. Palpation of heart with normal point of maximum impulse. No significant murmurs, rubs or gallops. Abdomen:        Soft and nontender. No palpable abdominal mass or bruits. Extremities:     Intact peripheral pulses. No significant edema. Neurological:   Alert and oriented to person, place, time. No focal neurological deficit visually. Skin:                No obvious rash 
  
Blood Pressure Metric: 
Shelly Escalante has been given the following recommendations today due to his elevated BP reading: monitor 
  
 
  
  
Electronically signed by Silvia Bay MD at 04/04/19 1035

## 2019-04-19 ENCOUNTER — HOSPITAL ENCOUNTER (OUTPATIENT)
Age: 59
Setting detail: OUTPATIENT SURGERY
Discharge: HOME | End: 2019-04-19
Attending: INTERNAL MEDICINE | Admitting: INTERNAL MEDICINE
Payer: MEDICAID

## 2019-04-19 ENCOUNTER — ANESTHESIA EVENT (OUTPATIENT)
Dept: CARDIAC CATH/INVASIVE PROCEDURES | Age: 59
End: 2019-04-19
Payer: MEDICAID

## 2019-04-19 ENCOUNTER — ANESTHESIA (OUTPATIENT)
Dept: CARDIAC CATH/INVASIVE PROCEDURES | Age: 59
End: 2019-04-19
Payer: MEDICAID

## 2019-04-19 VITALS
WEIGHT: 213 LBS | BODY MASS INDEX: 35.49 KG/M2 | SYSTOLIC BLOOD PRESSURE: 152 MMHG | RESPIRATION RATE: 12 BRPM | OXYGEN SATURATION: 97 % | TEMPERATURE: 96.2 F | DIASTOLIC BLOOD PRESSURE: 90 MMHG | HEIGHT: 65 IN | HEART RATE: 54 BPM

## 2019-04-19 DIAGNOSIS — M54.32 SCIATICA OF LEFT SIDE: Primary | ICD-10-CM

## 2019-04-19 DIAGNOSIS — I47.29 PAROXYSMAL VENTRICULAR TACHYCARDIA: ICD-10-CM

## 2019-04-19 PROCEDURE — 77030035291 HC TBNG PMP SMARTABLATE J&J -B: Performed by: INTERNAL MEDICINE

## 2019-04-19 PROCEDURE — 93613 INTRACARDIAC EPHYS 3D MAPG: CPT | Performed by: INTERNAL MEDICINE

## 2019-04-19 PROCEDURE — 77030027107 HC PTCH EXT REF CARTO3 J&J -F: Performed by: INTERNAL MEDICINE

## 2019-04-19 PROCEDURE — 93653 COMPRE EP EVAL TX SVT: CPT | Performed by: INTERNAL MEDICINE

## 2019-04-19 PROCEDURE — C1730 CATH, EP, 19 OR FEW ELECT: HCPCS | Performed by: INTERNAL MEDICINE

## 2019-04-19 PROCEDURE — 74011250636 HC RX REV CODE- 250/636

## 2019-04-19 PROCEDURE — 77030022017 HC DRSG HEMO QCLOT ZMED -A: Performed by: INTERNAL MEDICINE

## 2019-04-19 PROCEDURE — C1894 INTRO/SHEATH, NON-LASER: HCPCS | Performed by: INTERNAL MEDICINE

## 2019-04-19 PROCEDURE — 74011250636 HC RX REV CODE- 250/636: Performed by: INTERNAL MEDICINE

## 2019-04-19 PROCEDURE — 93655 ICAR CATH ABLTJ DSCRT ARRHYT: CPT | Performed by: INTERNAL MEDICINE

## 2019-04-19 PROCEDURE — 76060000034 HC ANESTHESIA 1.5 TO 2 HR: Performed by: INTERNAL MEDICINE

## 2019-04-19 PROCEDURE — 77030018729 HC ELECTRD DEFIB PAD CARD -B: Performed by: INTERNAL MEDICINE

## 2019-04-19 PROCEDURE — 93621 COMP EP EVL L PAC&REC C SINS: CPT | Performed by: INTERNAL MEDICINE

## 2019-04-19 PROCEDURE — C1732 CATH, EP, DIAG/ABL, 3D/VECT: HCPCS | Performed by: INTERNAL MEDICINE

## 2019-04-19 RX ORDER — OXYCODONE AND ACETAMINOPHEN 5; 325 MG/1; MG/1
1 TABLET ORAL
Qty: 15 TAB | Refills: 0 | Status: SHIPPED | OUTPATIENT
Start: 2019-04-19 | End: 2019-04-22

## 2019-04-19 RX ORDER — LIDOCAINE HYDROCHLORIDE 20 MG/ML
INJECTION, SOLUTION EPIDURAL; INFILTRATION; INTRACAUDAL; PERINEURAL AS NEEDED
Status: DISCONTINUED | OUTPATIENT
Start: 2019-04-19 | End: 2019-04-19 | Stop reason: HOSPADM

## 2019-04-19 RX ORDER — PROPOFOL 10 MG/ML
INJECTION, EMULSION INTRAVENOUS AS NEEDED
Status: DISCONTINUED | OUTPATIENT
Start: 2019-04-19 | End: 2019-04-19 | Stop reason: HOSPADM

## 2019-04-19 RX ORDER — SODIUM CHLORIDE, SODIUM LACTATE, POTASSIUM CHLORIDE, CALCIUM CHLORIDE 600; 310; 30; 20 MG/100ML; MG/100ML; MG/100ML; MG/100ML
INJECTION, SOLUTION INTRAVENOUS
Status: DISCONTINUED | OUTPATIENT
Start: 2019-04-19 | End: 2019-04-19 | Stop reason: HOSPADM

## 2019-04-19 RX ORDER — MIDAZOLAM HYDROCHLORIDE 1 MG/ML
INJECTION, SOLUTION INTRAMUSCULAR; INTRAVENOUS AS NEEDED
Status: DISCONTINUED | OUTPATIENT
Start: 2019-04-19 | End: 2019-04-19 | Stop reason: HOSPADM

## 2019-04-19 RX ORDER — OXYCODONE AND ACETAMINOPHEN 5; 325 MG/1; MG/1
1 TABLET ORAL
Status: DISCONTINUED | OUTPATIENT
Start: 2019-04-19 | End: 2019-04-19 | Stop reason: HOSPADM

## 2019-04-19 RX ORDER — LIDOCAINE HYDROCHLORIDE 10 MG/ML
INJECTION, SOLUTION EPIDURAL; INFILTRATION; INTRACAUDAL; PERINEURAL AS NEEDED
Status: DISCONTINUED | OUTPATIENT
Start: 2019-04-19 | End: 2019-04-19 | Stop reason: HOSPADM

## 2019-04-19 RX ADMIN — LIDOCAINE HYDROCHLORIDE 80 MG: 20 INJECTION, SOLUTION EPIDURAL; INFILTRATION; INTRACAUDAL; PERINEURAL at 10:18

## 2019-04-19 RX ADMIN — PROPOFOL 50 MG: 10 INJECTION, EMULSION INTRAVENOUS at 10:18

## 2019-04-19 RX ADMIN — PROPOFOL 50 MG: 10 INJECTION, EMULSION INTRAVENOUS at 10:28

## 2019-04-19 RX ADMIN — MIDAZOLAM HYDROCHLORIDE 2 MG: 1 INJECTION, SOLUTION INTRAMUSCULAR; INTRAVENOUS at 10:16

## 2019-04-19 RX ADMIN — SODIUM CHLORIDE, SODIUM LACTATE, POTASSIUM CHLORIDE, CALCIUM CHLORIDE: 600; 310; 30; 20 INJECTION, SOLUTION INTRAVENOUS at 10:13

## 2019-04-19 NOTE — DISCHARGE INSTRUCTIONS
Disposition:  When discharged to home will need follow-up in the office with Dr. Kojo Alvarado. Restrictions:  No driving for at least 24 hours after surgery. No heavy lifting > 10 lbs or prolonged standing, walking, or running x 1 week. OK to shower today over incision and remove dressing. Monitor groin for any signs of bleeding and please contact office at [phone] if any issues. There may be some mild bruising which is not unusual.  If any new symptoms develop, such as chest pain, dyspnea, dizziness, please contact office at [phone] immediately. ABLATION DISCHARGE INSTRUCTIONS    It is normal to feel tired the first couple days. Take it easy and follow the physicians instructions. CHECK THE CATHETER INSERTION SITE DAILY:  You may shower 24 hours after the procedure, remove the bandage during showering. Wash with soap and water and pat dry. Gentle cleaning of the site with soap and water is sufficient, cover with a dry clean dressing or bandage. Do not apply creams or powders to the area. Do not sit in a bathtub or pool of water for 7 days or until wound has completely healed. Temporary bruising and discomfort is normal and may last a few weeks. You may have a  formation of a small lump at the site which may last up to 6 weeks. CALL THE PHYSICIAN:  If the site becomes red, swollen or feels warm to the touch  If there is bleeding or drainage or if there is unusual pain at the groin or down the leg. If there is any bleeding, lie down, apply pressure or have someone apply pressure with a clean cloth until the bleeding stops. If the bleeding continues, call 911 to be transported to the hospital.  DO NOT DRIVE YOURSELF, [place] [address]. Activity:      For the first 24-48 hours or as instructed by the physician:  No lifting, pushing or pulling over 10 pounds and no straining the insertion site.  Do not life grocery bags or the garbage can, do not run the vacuum  or  for 7 days. Start with short walks as in the hospital and gradually increase as tolerated each day. It is recommended to walk 30 minutes 5-7 days per week. Follow your physicians instructions on activity. Avoid walking outside in extremes of heat or cold. Walk inside when it is cold and windy or hot and humid. Things to keep in mind:  No driving for at least 24 hours, or as designated by your physician. Limit the number of times you go up and down the stairs  Take rests and pace yourself with activity. Be careful and do not strain with bowel movements. Medications: Take all medications as prescribed  Call your physician if you have any questions  Keep an updated list of your medications with you at all times and give a list to your physician and pharmacist    Signs and Symptoms:  Be cautious of symptoms of angina or recurrent symptoms such as chest discomfort, unusual shortness of breath or fatigue, palpitations. After Care: Follow up with your physician as instructed. Follow a heart healthy diet with proper portion control, daily stress management, daily exercise, blood pressure and cholesterol control , and smoking cessation.

## 2019-04-19 NOTE — Clinical Note
TRANSFER - OUT REPORT:  
 
[facility] Report consisted of patient's Situation, Background, Assessment and  
Recommendations(SBAR). Opportunity for questions and clarification was provided. Patient transported to: [address].

## 2019-04-19 NOTE — ADDENDUM NOTE
Addendum  created 04/19/19 1208 by Mazin Ruiz MD  
 Review and Sign - Ready for Procedure, Review and Sign - Signed, Sign clinical note

## 2019-04-19 NOTE — ANESTHESIA PREPROCEDURE EVALUATION
Relevant Problems No relevant active problems Anesthetic History No history of anesthetic complications Review of Systems / Medical History Patient summary reviewed and pertinent labs reviewed Pulmonary Asthma Neuro/Psych Within defined limits Cardiovascular Dysrhythmias : SVT Exercise tolerance: >4 METS 
  
GI/Hepatic/Renal 
Within defined limits Endo/Other Other Findings Physical Exam 
 
Airway Mallampati: I 
TM Distance: 4 - 6 cm Neck ROM: normal range of motion Mouth opening: Normal 
 
 Cardiovascular Regular rate and rhythm,  S1 and S2 normal,  no murmur, click, rub, or gallop Rhythm: regular Rate: normal 
 
 
 
 Dental 
 
Dentition: Lower dentition intact, Upper dentition intact and Implants Pulmonary Breath sounds clear to auscultation Abdominal 
GI exam deferred Other Findings Anesthetic Plan ASA: 3 Anesthesia type: MAC Induction: Intravenous Anesthetic plan and risks discussed with: Patient and Spouse

## 2019-04-19 NOTE — PROGRESS NOTES
0845: Pt brought to [facility] 108 ambulatory. Pt placed in bay 3 and connected to monitor. Baseline VS taken and PIV started x 2. Admission database completed. Pt ready for ordered procedure. 0900: pt noted to have taken dose of Eliquis last night. Dr. Mo Turcios made aware. OK to proceed 1008: Pt taken to EP lab for ordered procedure. Report given to Bree Smyth 12: Pt returned to cath holding post procedure. Pt resting on stretcher with eyes closed. Easily aroused by voice. 1230: Pt resting on stretcher with eyes closed. No needs noted at this time 1330: Attempted to progress pt up for discharge. Right groin site noted to have rebled. Manual pressure applied x 10 min. Hemostasis achieved. 1400: Groin site remains CDI. HOB elevated 30 degrees for slow progression to prevent rebleed. 1430: Groin site remains CDI. HOB elevated to 90 degrees without complication 1455: Pt provided discharge instructions. All questions answered and pt verbalized understanding. PIV x 2 removed. No hematoma or bleeding noted at this time. Procedure site within normal limits. Pt escorted to front of building for discharge.

## 2019-04-19 NOTE — ANESTHESIA POSTPROCEDURE EVALUATION
Procedure(s): 
ABLATION SVT Lt Atrial Pace & Record During Ep Study Ablation Svt/Vt Add On. MAC Anesthesia Post Evaluation Multimodal analgesia: multimodal analgesia used between 6 hours prior to anesthesia start to PACU discharge Patient location during evaluation: bedside Patient participation: complete - patient participated Level of consciousness: awake Pain score: 0 Pain management: adequate Airway patency: patent Anesthetic complications: no 
Cardiovascular status: stable Respiratory status: acceptable Hydration status: acceptable Post anesthesia nausea and vomiting:  none Vitals Value Taken Time /103 4/19/2019 11:42 AM  
Temp 35.7 °C (96.2 °F) 4/19/2019 11:42 AM  
Pulse 57 4/19/2019 11:42 AM  
Resp 17 4/19/2019 11:42 AM  
SpO2 98 % 4/19/2019 11:42 AM

## 2019-04-19 NOTE — Clinical Note
TRANSFER - IN REPORT:  
 
Verbal report received from: [employer]. Report consisted of patient's Situation, Background, Assessment and  
Recommendations(SBAR). Opportunity for questions and clarification was provided. Assessment completed upon patient's arrival to unit and care assumed.

## 2019-04-30 RX ORDER — METOPROLOL TARTRATE 25 MG/1
25 TABLET, FILM COATED ORAL 2 TIMES DAILY
Qty: 60 TAB | Refills: 3 | Status: SHIPPED | OUTPATIENT
Start: 2019-04-30 | End: 2019-05-16 | Stop reason: SDUPTHER

## 2019-05-16 ENCOUNTER — OFFICE VISIT (OUTPATIENT)
Dept: CARDIOLOGY CLINIC | Age: 59
End: 2019-05-16

## 2019-05-16 VITALS
SYSTOLIC BLOOD PRESSURE: 94 MMHG | HEART RATE: 80 BPM | HEIGHT: 65 IN | DIASTOLIC BLOOD PRESSURE: 69 MMHG | BODY MASS INDEX: 35.49 KG/M2 | WEIGHT: 213 LBS

## 2019-05-16 DIAGNOSIS — I47.1 SVT (SUPRAVENTRICULAR TACHYCARDIA) (HCC): ICD-10-CM

## 2019-05-16 DIAGNOSIS — I50.32 CHRONIC DIASTOLIC CONGESTIVE HEART FAILURE (HCC): Primary | ICD-10-CM

## 2019-05-16 DIAGNOSIS — I48.0 PAROXYSMAL ATRIAL FIBRILLATION (HCC): ICD-10-CM

## 2019-05-16 RX ORDER — METOPROLOL TARTRATE 25 MG/1
25 TABLET, FILM COATED ORAL 2 TIMES DAILY
Qty: 60 TAB | Refills: 3 | Status: SHIPPED | OUTPATIENT
Start: 2019-05-16 | End: 2019-06-24 | Stop reason: SDUPTHER

## 2019-05-16 RX ORDER — DULOXETIN HYDROCHLORIDE 30 MG/1
30 CAPSULE, DELAYED RELEASE ORAL DAILY
Qty: 30 CAP | Refills: 0 | Status: SHIPPED | OUTPATIENT
Start: 2019-05-16

## 2019-05-16 RX ORDER — FUROSEMIDE 20 MG/1
20 TABLET ORAL DAILY
Qty: 30 TAB | Refills: 4 | Status: SHIPPED | OUTPATIENT
Start: 2019-05-16 | End: 2020-01-31

## 2019-05-16 RX ORDER — MONTELUKAST SODIUM 10 MG/1
10 TABLET ORAL DAILY
Qty: 30 TAB | Refills: 0 | Status: SHIPPED | OUTPATIENT
Start: 2019-05-16

## 2019-05-16 NOTE — PROGRESS NOTES
1. Have you been to the ER, urgent care clinic since your last visit? Hospitalized since your last visit? No    2. Have you seen or consulted any other health care providers outside of the [address] [place] [address] since your last visit? Include any pap smears or colon screening.  No

## 2019-05-16 NOTE — PROGRESS NOTES
HISTORY OF PRESENT ILLNESS  Mac Jimenez is a 64 y.o. male. Shortness of Breath   The history is provided by the patient. This is a chronic problem. The problem occurs intermittently. The problem has been gradually improving. Pertinent negatives include no fever, no ear pain, no neck pain, no cough, no sputum production, no hemoptysis, no wheezing, no PND, no orthopnea, no vomiting, no rash, no leg swelling and no claudication. Associated medical issues include heart failure. Palpitations    The history is provided by the patient. This is a chronic problem. The problem has been rapidly improving. The problem occurs every several days. Associated symptoms include shortness of breath. Pertinent negatives include no diaphoresis, no fever, no claudication, no orthopnea, no PND, no nausea, no vomiting, no dizziness, no weakness, no cough, no hemoptysis and no sputum production. Review of Systems   Constitutional: Negative for chills, diaphoresis, fever and weight loss. HENT: Negative for ear pain and hearing loss. Eyes: Negative for blurred vision. Respiratory: Positive for shortness of breath. Negative for cough, hemoptysis, sputum production, wheezing and stridor. Cardiovascular: Positive for palpitations. Negative for orthopnea, claudication, leg swelling and PND. Gastrointestinal: Negative for heartburn, nausea and vomiting. Musculoskeletal: Negative for myalgias and neck pain. Skin: Negative for rash. Neurological: Negative for dizziness, tingling, tremors, focal weakness, loss of consciousness and weakness. Psychiatric/Behavioral: Negative for depression and suicidal ideas.      Family History   Problem Relation Age of Onset    Diabetes Mother     Hypertension Mother     Asthma Mother     No Known Problems Father        Past Medical History:   Diagnosis Date    Asthma        Past Surgical History:   Procedure Laterality Date    [place] COMPRE ELECTROPHYSIOL XM W/LEFT ATRIAL PACNG/REC N/A 3/12/2019    Lt Atrial Pace & Record During Ep Study performed by [facility]ly Hensley MD at [facility]    [place] COMPRE ELECTROPHYSIOL XM W/LEFT ATRIAL PACNG/REC N/A 4/19/2019    Lt Atrial Pace & Record During Ep Study performed by Melly Hensley MD at [facility] LAB     [address] INDUCTION N/A 3/12/2019    EP STUDY COMPLETE performed by Melly Hensley MD at [facility] LAB    [place] EPHYS EVAL W/ABLATION [address] N/A 4/19/2019    ABLATION SVT performed by Melly Hensley MD at [facility] LAB     [address] ADD ON N/A 4/19/2019    Ablation Svt/Vt Add On performed by Melly Hensley MD at [facility] LAB       Social History     Tobacco Use    Smoking status: Current Every Day Smoker     Packs/day: 0.50     Types: Cigarettes    Smokeless tobacco: Never Used   Substance Use Topics    Alcohol use: Yes     Frequency: 4 or more times a week     Drinks per session: 3 or 4     Binge frequency: Never       No Known Allergies    Outpatient Medications Marked as Taking for the 5/16/19 encounter (Office Visit) with Eyad Cha MD   Medication Sig Dispense Refill    metoprolol tartrate (LOPRESSOR) 25 mg tablet Take 1 Tab by mouth two (2) times a day. Indications: Paroxysmal Supraventricular Tachycardia 60 Tab 3    montelukast (SINGULAIR) 10 mg tablet Take 10 mg by mouth daily.  DULoxetine (CYMBALTA) 30 mg capsule Take 30 mg by mouth daily.  apixaban (ELIQUIS) 5 mg tablet Take 1 Tab by mouth two (2) times a day. 60 Tab 6    furosemide (LASIX) 20 mg tablet Take 1 Tab by mouth daily. 30 Tab 4    albuterol (PROVENTIL HFA, VENTOLIN HFA, PROAIR HFA) 90 mcg/actuation inhaler Take 2 Puffs by inhalation every four (4) hours as needed.           Visit Vitals  BP 94/69   Pulse 80   Ht 5' 5\" (1.651 m)   Wt 96.6 kg (213 lb)   BMI 35.45 kg/m²     Physical Exam   Constitutional: He is oriented to person, place, and time. He appears well-developed and well-nourished. No distress. HENT:   Head: Atraumatic. Mouth/Throat: No oropharyngeal exudate. Eyes: Conjunctivae are normal. No scleral icterus. Neck: Normal range of motion. Neck supple. No JVD present. No tracheal deviation present. No thyromegaly present. Cardiovascular: Normal rate and regular rhythm. Exam reveals no gallop. No murmur heard. Pulmonary/Chest: Effort normal and breath sounds normal. No stridor. He has no wheezes. He has no rales. Abdominal: Soft. There is no tenderness. There is no rebound and no guarding. Musculoskeletal: Normal range of motion. He exhibits edema (mild ble edema). He exhibits no tenderness. Neurological: He is alert and oriented to person, place, and time. He exhibits normal muscle tone. Skin: Skin is warm. He is not diaphoretic. Psychiatric: He has a normal mood and affect. His behavior is normal.     ekg sinus rhythm with WPW  ASSESSMENT and PLAN    ICD-10-CM ICD-9-CM    1. Chronic diastolic congestive heart failure (HCC) I50.32 428.32      428.0     NYHA class II   2. SVT (supraventricular tachycardia) ([facility]) I47.1 427.89     s/p ablation   3. Paroxysmal atrial fibrillation (HCC) I48.0 427.31      No orders of the defined types were placed in this encounter. Follow-up and Dispositions    · Return in about 2 months (around 7/16/2019). reviewed diet, exercise and weight control  very strongly urged to quit smoking to reduce cardiovascular risk. Patient is a 68-year-old male seen for worsening exertional dyspnea and abnormal EKG. Holter monitor revealed atrial flutter and intermittent episodes of wide-complex tachycardia. He subsequently underwent an EP study which revealed SVT (AVNRT) and atrial flutter. S/p ablation of AVNRT and atrial flutter- has Paroxsymal atrial fibrillation for which he will continue eliquis.

## 2019-06-25 RX ORDER — METOPROLOL TARTRATE 25 MG/1
25 TABLET, FILM COATED ORAL 2 TIMES DAILY
Qty: 180 TAB | Refills: 2 | Status: SHIPPED | OUTPATIENT
Start: 2019-06-25 | End: 2019-07-02 | Stop reason: SDUPTHER

## 2019-07-02 RX ORDER — METOPROLOL TARTRATE 25 MG/1
25 TABLET, FILM COATED ORAL 2 TIMES DAILY
Qty: 180 TAB | Refills: 2 | Status: SHIPPED | OUTPATIENT
Start: 2019-07-02

## 2019-07-25 ENCOUNTER — OFFICE VISIT (OUTPATIENT)
Dept: CARDIOLOGY CLINIC | Age: 59
End: 2019-07-25

## 2019-07-25 VITALS
BODY MASS INDEX: 34.32 KG/M2 | HEIGHT: 65 IN | SYSTOLIC BLOOD PRESSURE: 116 MMHG | HEART RATE: 64 BPM | WEIGHT: 206 LBS | DIASTOLIC BLOOD PRESSURE: 76 MMHG

## 2019-07-25 DIAGNOSIS — I50.32 CHRONIC DIASTOLIC CONGESTIVE HEART FAILURE (HCC): Primary | ICD-10-CM

## 2019-07-25 DIAGNOSIS — I47.1 SVT (SUPRAVENTRICULAR TACHYCARDIA) (HCC): ICD-10-CM

## 2019-07-25 DIAGNOSIS — I48.0 PAROXYSMAL ATRIAL FIBRILLATION (HCC): ICD-10-CM

## 2019-07-25 RX ORDER — OXYCODONE AND ACETAMINOPHEN 5; 325 MG/1; MG/1
TABLET ORAL
COMMUNITY

## 2019-07-25 RX ORDER — TAMSULOSIN HYDROCHLORIDE 0.4 MG/1
0.4 CAPSULE ORAL DAILY
COMMUNITY

## 2019-07-25 RX ORDER — OMEPRAZOLE 20 MG/1
20 CAPSULE, DELAYED RELEASE ORAL DAILY
COMMUNITY

## 2019-07-25 NOTE — PROGRESS NOTES
HISTORY OF PRESENT ILLNESS  Ori Gunn is a 64 y.o. male. Shortness of Breath   The history is provided by the patient. This is a chronic problem. The problem occurs intermittently. The problem has been gradually improving. Pertinent negatives include no fever, no ear pain, no neck pain, no cough, no sputum production, no hemoptysis, no wheezing, no PND, no orthopnea, no vomiting, no rash, no leg swelling and no claudication. Associated medical issues include heart failure. Palpitations    The history is provided by the patient. This is a chronic problem. The problem has been rapidly improving. The problem occurs every several days. Pertinent negatives include no diaphoresis, no fever, no claudication, no orthopnea, no PND, no nausea, no vomiting, no dizziness, no weakness, no cough, no hemoptysis and no sputum production. Review of Systems   Constitutional: Negative for chills, diaphoresis, fever and weight loss. HENT: Negative for ear pain and hearing loss. Eyes: Negative for blurred vision. Respiratory: Negative for cough, hemoptysis, sputum production, wheezing and stridor. Cardiovascular: Positive for palpitations. Negative for orthopnea, claudication, leg swelling and PND. Gastrointestinal: Negative for heartburn, nausea and vomiting. Musculoskeletal: Negative for myalgias and neck pain. Skin: Negative for rash. Neurological: Negative for dizziness, tingling, tremors, focal weakness, loss of consciousness and weakness. Psychiatric/Behavioral: Negative for depression and suicidal ideas.      Family History   Problem Relation Age of Onset    Diabetes Mother     Hypertension Mother    [address] Asthma Mother     No Known Problems Father        Past Medical History:   Diagnosis Date    Asthma        Past Surgical History:   Procedure Laterality Date    [place] COMPRE ELECTROPHYSIOL XM W/LEFT ATRIAL PACNG/REC N/A 3/12/2019    Lt Atrial Pace & Record During Ep Study performed by Sheffield Olszewski MD YI at [facility]    [place] MARVIN ELECTROPHYSIOL XM W/LEFT ATRIAL PACNG/REC N/A 4/19/2019    Lt Atrial Pace & Record During Ep Study performed by Cortland Duverney, MD at [facility]    ND MARVIN ELECTROPHYSIOLOGIC ARRHYTHMIA INDUCTION N/A 3/12/2019    EP STUDY COMPLETE performed by Cortland Duverney, MD at [facility]    [place] EPHYS EVAL W/ABLATION [address] N/A 4/19/2019    ABLATION SVT performed by Cortland Duverney, MD at [facility]     [address] ADD ON N/A 4/19/2019    Ablation Svt/Vt Add On performed by Cortland Duverney, MD at [facility]       Social History     Tobacco Use    Smoking status: Current Every Day Smoker     Packs/day: 0.50     Types: Cigarettes    Smokeless tobacco: Never Used   Substance Use Topics    Alcohol use: Yes     Frequency: 4 or more times a week     Drinks per session: 3 or 4     Binge frequency: Never       No Known Allergies         Visit Vitals  Ht 5' 5\" (1.651 m)   Wt 93.4 kg (206 lb)   BMI 34.28 kg/m²     Physical Exam   Constitutional: He is oriented to person, place, and time. He appears well-developed and well-nourished. No distress. HENT:   Head: Atraumatic. Mouth/Throat: No oropharyngeal exudate. Eyes: Conjunctivae are normal. No scleral icterus. Neck: Normal range of motion. Neck supple. No JVD present. No tracheal deviation present. No thyromegaly present. Cardiovascular: Normal rate and regular rhythm. Exam reveals no gallop. No murmur heard. Pulmonary/Chest: Effort normal and breath sounds normal. No stridor. He has no wheezes. He has no rales. Abdominal: Soft. There is no tenderness. There is no rebound and no guarding. Musculoskeletal: Normal range of motion. He exhibits edema (mild ble edema). He exhibits no tenderness. Neurological: He is alert and oriented to person, place, and time. He exhibits normal muscle tone. Skin: Skin is warm. He is not diaphoretic. Psychiatric: He has a normal mood and affect. His behavior is normal.     ekg sinus rhythm with WPW  ASSESSMENT and PLAN    ICD-10-CM ICD-9-CM    1. Chronic diastolic congestive heart failure (HCC) I50.32 428.32      428.0    2. SVT (supraventricular tachycardia) ([facility]) I47.1 427.89    3. Paroxysmal atrial fibrillation ([facility]) I48.0 427.31      No orders of the defined types were placed in this encounter. reviewed diet, exercise and weight control  very strongly urged to quit smoking to reduce cardiovascular risk. Patient is a 61-year-old male seen for worsening exertional dyspnea and abnormal EKG. Holter monitor revealed atrial flutter and intermittent episodes of wide-complex tachycardia. He subsequently underwent an EP study which revealed SVT (AVNRT) and atrial flutter. S/p ablation of AVNRT and atrial flutter- has Paroxsymal atrial fibrillation for which he is on eliquis. Patient seen for follow-up. Complains of intermittent palpitations mainly at night lasting few minutes. No syncope or near syncope reported. Will obtain a cardiac monitor to evaluate atrial arrhythmias.

## 2019-07-25 NOTE — PROGRESS NOTES
1. Have you been to the ER, urgent care clinic since your last visit? Hospitalized since your last visit? No    2. Have you seen or consulted any other health care providers outside of the [address] [place] [address] since your last visit? Include any pap smears or colon screening.  No

## 2019-08-27 ENCOUNTER — OFFICE VISIT (OUTPATIENT)
Dept: CARDIOLOGY CLINIC | Age: 59
End: 2019-08-27

## 2019-08-27 VITALS
DIASTOLIC BLOOD PRESSURE: 83 MMHG | WEIGHT: 204.6 LBS | BODY MASS INDEX: 34.09 KG/M2 | HEIGHT: 65 IN | SYSTOLIC BLOOD PRESSURE: 117 MMHG | HEART RATE: 53 BPM

## 2019-08-27 DIAGNOSIS — I48.0 PAROXYSMAL ATRIAL FIBRILLATION (HCC): ICD-10-CM

## 2019-08-27 DIAGNOSIS — I47.1 SVT (SUPRAVENTRICULAR TACHYCARDIA) (HCC): Primary | ICD-10-CM

## 2019-08-27 DIAGNOSIS — I50.32 CHRONIC DIASTOLIC CONGESTIVE HEART FAILURE (HCC): ICD-10-CM

## 2019-08-27 DIAGNOSIS — Z98.890 S/P ABLATION OF ACCESSORY BYPASS TRACT: ICD-10-CM

## 2019-08-27 NOTE — PROGRESS NOTES
HISTORY OF PRESENT ILLNESS  Curtis Cosme is a 64 y.o. male. Shortness of Breath   The history is provided by the patient. This is a chronic problem. The problem occurs intermittently. The problem has been gradually improving. Pertinent negatives include no fever, no ear pain, no neck pain, no cough, no sputum production, no hemoptysis, no wheezing, no PND, no orthopnea, no vomiting, no rash, no leg swelling and no claudication. Associated medical issues include heart failure. Palpitations    The history is provided by the patient. This is a chronic problem. The problem has been rapidly improving. The problem occurs rarely. Pertinent negatives include no diaphoresis, no fever, no claudication, no orthopnea, no PND, no nausea, no vomiting, no dizziness, no weakness, no cough, no hemoptysis and no sputum production. Review of Systems   Constitutional: Negative for chills, diaphoresis, fever and weight loss. HENT: Negative for ear pain and hearing loss. Eyes: Negative for blurred vision. Respiratory: Negative for cough, hemoptysis, sputum production, wheezing and stridor. Cardiovascular: Positive for palpitations. Negative for orthopnea, claudication, leg swelling and PND. Gastrointestinal: Negative for heartburn, nausea and vomiting. Musculoskeletal: Negative for myalgias and neck pain. Skin: Negative for rash. Neurological: Negative for dizziness, tingling, tremors, focal weakness, loss of consciousness and weakness. Psychiatric/Behavioral: Negative for depression and suicidal ideas.      Family History   Problem Relation Age of Onset    Diabetes Mother     Hypertension Mother     Asthma Mother     No Known Problems Father        Past Medical History:   Diagnosis Date    Asthma        Past Surgical History:   Procedure Laterality Date    [place] COMPRE ELECTROPHYSIOL XM W/LEFT ATRIAL PACNG/REC N/A 3/12/2019    Lt Atrial Pace & Record During Ep Study performed by Eileen Parra MD at [facility]ARDIAC CATH LAB    [place] MARVIN ELECTROPHYSIOL XM W/LEFT ATRIAL PACNG/REC N/A 4/19/2019    Lt Atrial Pace & Record During Ep Study performed by Lima Covarrubias MD at [facility] CATH LAB    [place] MARVIN ELECTROPHYSIOLOGIC ARRHYTHMIA INDUCTION N/A 3/12/2019    EP STUDY COMPLETE performed by Lima Covarrubias MD at [facility] CATH LAB    [place] EPHYS EVAL W/ABLATION [address] N/A 4/19/2019    ABLATION SVT performed by Lima Covarrubias MD at [facility] CATH LAB     [address] ADD ON N/A 4/19/2019    Ablation Svt/Vt Add On performed by Lima Covarrubias MD at [facility] CATH LAB       Social History     Tobacco Use    Smoking status: Current Every Day Smoker     Packs/day: 0.50     Types: Cigarettes    Smokeless tobacco: Never Used   Substance Use Topics    Alcohol use: Yes     Frequency: 4 or more times a week     Drinks per session: 3 or 4     Binge frequency: Never       No Known Allergies         Visit Vitals  /83 (BP 1 Location: Left arm, BP Patient Position: Sitting)   Pulse (!) 53   Ht 5' 5\" (1.651 m)   Wt 92.8 kg (204 lb 9.6 oz)   BMI 34.05 kg/m²     Physical Exam   Constitutional: He is oriented to person, place, and time. He appears well-developed and well-nourished. No distress. HENT:   Head: Atraumatic. Mouth/Throat: No oropharyngeal exudate. Eyes: Conjunctivae are normal. No scleral icterus. Neck: Normal range of motion. Neck supple. No JVD present. No tracheal deviation present. No thyromegaly present. Cardiovascular: Normal rate and regular rhythm. Exam reveals no gallop. No murmur heard. Pulmonary/Chest: Effort normal and breath sounds normal. No stridor. He has no wheezes. He has no rales. Abdominal: Soft. There is no tenderness. There is no rebound and no guarding. Musculoskeletal: Normal range of motion. He exhibits edema (mild ble edema). He exhibits no tenderness. Neurological: He is alert and oriented to person, place, and time.  He exhibits normal muscle tone. Skin: Skin is warm. He is not diaphoretic. Psychiatric: He has a normal mood and affect. His behavior is normal.     ekg sinus rhythm with WPW  ASSESSMENT and PLAN    ICD-10-CM ICD-9-CM    1. SVT (supraventricular tachycardia) (HCC) I47.1 427.89    2. Chronic diastolic congestive heart failure (HCC) I50.32 428.32      428.0    3. Paroxysmal atrial fibrillation (HCC) I48.0 427.31    4. 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 Follow-up and Dispositions    · Return in about 4 months (around 12/27/2019). reviewed diet, exercise and weight control  very strongly urged to quit smoking to reduce cardiovascular risk. Patient is a 61-year-old male seen for worsening exertional dyspnea and abnormal EKG. Holter monitor revealed atrial flutter and intermittent episodes of wide-complex tachycardia. He subsequently underwent an EP study which revealed SVT (AVNRT) and atrial flutter. S/p ablation of AVNRT and atrial flutter- has Paroxsymal atrial fibrillation for which he is on eliquis. Patient seen for follow-up. C/o palpitations- s/p cardiac monitor- no further recurrence of atrial or ventricular arrhythymias. Continue low dose BB and eliquis.

## 2019-08-27 NOTE — PROGRESS NOTES
1. Have you been to the ER, urgent care clinic since your last visit? Hospitalized since your last visit? No    2. Have you seen or consulted any other health care providers outside of the [address] [place] [address] since your last visit? Include any pap smears or colon screening.  No

## 2019-09-24 ENCOUNTER — HOSPITAL ENCOUNTER (OUTPATIENT)
Dept: GENERAL RADIOLOGY | Age: 59
Discharge: HOME | End: 2019-09-24
Payer: MEDICAID

## 2019-09-24 DIAGNOSIS — M25.512 SHOULDER PAIN, BILATERAL: ICD-10-CM

## 2019-09-24 DIAGNOSIS — M25.511 SHOULDER PAIN, BILATERAL: ICD-10-CM

## 2019-09-24 PROCEDURE — 73030 X-RAY EXAM OF SHOULDER: CPT

## 2020-01-31 RX ORDER — FUROSEMIDE 20 MG/1
TABLET ORAL
Qty: 30 TAB | Refills: 0 | Status: SHIPPED | OUTPATIENT
Start: 2020-01-31

## 2020-05-15 RX ORDER — APIXABAN 5 MG/1
TABLET, FILM COATED ORAL
Qty: 60 TAB | Refills: 0 | Status: SHIPPED | OUTPATIENT
Start: 2020-05-15

## 2020-11-04 NOTE — PROGRESS NOTES
Authorization for CT scan per [facility]' with [facility] # [id number] valid from 01-23-19 through 03-19-19 Patient called and advised of negative CT/GC results via voicemail as confirmed at DOS. Patient encouraged to call clinic with any questions/concerns.

## 2022-03-20 PROBLEM — E66.01 SEVERE OBESITY (HCC): Status: ACTIVE | Noted: 2018-10-29

## 2022-03-20 PROBLEM — M54.32 SCIATICA OF LEFT SIDE: Status: ACTIVE | Noted: 2018-11-20

## 2023-11-29 ENCOUNTER — APPOINTMENT (OUTPATIENT)
Facility: HOSPITAL | Age: 63
End: 2023-11-29
Payer: MEDICARE

## 2023-11-29 ENCOUNTER — HOSPITAL ENCOUNTER (OUTPATIENT)
Facility: HOSPITAL | Age: 63
Discharge: HOME | End: 2023-12-02
Payer: MEDICARE

## 2023-11-29 DIAGNOSIS — S99.921A INJURY OF TOE ON RIGHT FOOT, INITIAL ENCOUNTER: ICD-10-CM

## 2023-11-29 PROCEDURE — 73630 X-RAY EXAM OF FOOT: CPT

## (undated) DEVICE — CATH BIDRCT FJ 8FR 115CMX3.5MM --

## (undated) DEVICE — TUBE SET IRR PUMP THERMALCOOL -- SMARTABLATE

## (undated) DEVICE — INTRODUCER SHTH 6FR CANN L11CM DIL TIP 35MM GRN TUNGSTEN

## (undated) DEVICE — LIMB HOLDER, WRIST/ANKLE: Brand: DEROYAL

## (undated) DEVICE — REM POLYHESIVE ADULT PATIENT RETURN ELECTRODE: Brand: VALLEYLAB

## (undated) DEVICE — DRESSING HEMSTAT W4XL4IN 4 PLY WHT IMPREG KAOLIN HYDRPHLC

## (undated) DEVICE — KENDALL RADIOLUCENT FOAM MONITORING ELECTRODE RECTANGULAR SHAPE: Brand: KENDALL

## (undated) DEVICE — MEDI-TRACE CADENCE ADULT, DEFIBRILLATION ELECTRODE -RTS  (10 PR/PK) - PHYSIO-CONTROL: Brand: MEDI-TRACE CADENCE

## (undated) DEVICE — CATHETER ELECTROPHYSIOLOGY CRD 2 5 MM 6 FRX120 CM SUPREME

## (undated) DEVICE — CATH QUAD 6F 2/5/2 120CM JSN --

## (undated) DEVICE — INTRODUCER SHTH 8FR CANN L11CM DIL TIP 35MM BLU TUNGSTEN

## (undated) DEVICE — INTRODUCER SHTH 7FR CANN L11CM DIL TIP 35MM ORNG TUNGSTEN

## (undated) DEVICE — UNIDIRECTIONAL STEERABLE DIAGNOSTIC CATHETER: Brand: EP XT™

## (undated) DEVICE — PACK PROCEDURE SURG VASC CATH 161 MMC LF

## (undated) DEVICE — PATCH CARTO 3 EXT REF --